# Patient Record
Sex: FEMALE | Race: AMERICAN INDIAN OR ALASKA NATIVE | NOT HISPANIC OR LATINO | URBAN - METROPOLITAN AREA
[De-identification: names, ages, dates, MRNs, and addresses within clinical notes are randomized per-mention and may not be internally consistent; named-entity substitution may affect disease eponyms.]

---

## 2023-09-30 ENCOUNTER — INPATIENT (INPATIENT)
Facility: HOSPITAL | Age: 88
LOS: 3 days | Discharge: HOME | End: 2023-10-04
Attending: STUDENT IN AN ORGANIZED HEALTH CARE EDUCATION/TRAINING PROGRAM | Admitting: STUDENT IN AN ORGANIZED HEALTH CARE EDUCATION/TRAINING PROGRAM
Payer: MEDICARE

## 2023-09-30 VITALS
DIASTOLIC BLOOD PRESSURE: 61 MMHG | RESPIRATION RATE: 20 BRPM | SYSTOLIC BLOOD PRESSURE: 172 MMHG | HEART RATE: 83 BPM | TEMPERATURE: 97 F | OXYGEN SATURATION: 92 %

## 2023-09-30 DIAGNOSIS — Z29.9 ENCOUNTER FOR PROPHYLACTIC MEASURES, UNSPECIFIED: ICD-10-CM

## 2023-09-30 DIAGNOSIS — Z71.89 OTHER SPECIFIED COUNSELING: ICD-10-CM

## 2023-09-30 DIAGNOSIS — F03.90 UNSPECIFIED DEMENTIA, UNSPECIFIED SEVERITY, WITHOUT BEHAVIORAL DISTURBANCE, PSYCHOTIC DISTURBANCE, MOOD DISTURBANCE, AND ANXIETY: ICD-10-CM

## 2023-09-30 DIAGNOSIS — R41.82 ALTERED MENTAL STATUS, UNSPECIFIED: ICD-10-CM

## 2023-09-30 DIAGNOSIS — R56.9 UNSPECIFIED CONVULSIONS: ICD-10-CM

## 2023-09-30 DIAGNOSIS — B34.8 OTHER VIRAL INFECTIONS OF UNSPECIFIED SITE: ICD-10-CM

## 2023-09-30 LAB
ALBUMIN SERPL ELPH-MCNC: 4.4 G/DL — SIGNIFICANT CHANGE UP (ref 3.3–5)
ALP SERPL-CCNC: 83 U/L — SIGNIFICANT CHANGE UP (ref 40–120)
ALT FLD-CCNC: 14 U/L — SIGNIFICANT CHANGE UP (ref 4–33)
ANION GAP SERPL CALC-SCNC: 17 MMOL/L — HIGH (ref 7–14)
APPEARANCE UR: ABNORMAL
APTT BLD: 30.1 SEC — SIGNIFICANT CHANGE UP (ref 24.5–35.6)
AST SERPL-CCNC: 27 U/L — SIGNIFICANT CHANGE UP (ref 4–32)
B PERT DNA SPEC QL NAA+PROBE: SIGNIFICANT CHANGE UP
B PERT+PARAPERT DNA PNL SPEC NAA+PROBE: SIGNIFICANT CHANGE UP
BACTERIA # UR AUTO: ABNORMAL /HPF
BASOPHILS # BLD AUTO: 0.03 K/UL — SIGNIFICANT CHANGE UP (ref 0–0.2)
BASOPHILS NFR BLD AUTO: 0.2 % — SIGNIFICANT CHANGE UP (ref 0–2)
BILIRUB SERPL-MCNC: 0.9 MG/DL — SIGNIFICANT CHANGE UP (ref 0.2–1.2)
BILIRUB UR-MCNC: NEGATIVE — SIGNIFICANT CHANGE UP
BORDETELLA PARAPERTUSSIS (RAPRVP): SIGNIFICANT CHANGE UP
BUN SERPL-MCNC: 16 MG/DL — SIGNIFICANT CHANGE UP (ref 7–23)
C PNEUM DNA SPEC QL NAA+PROBE: SIGNIFICANT CHANGE UP
CALCIUM SERPL-MCNC: 9.5 MG/DL — SIGNIFICANT CHANGE UP (ref 8.4–10.5)
CAST: 0 /LPF — SIGNIFICANT CHANGE UP (ref 0–4)
CHLORIDE SERPL-SCNC: 97 MMOL/L — LOW (ref 98–107)
CO2 SERPL-SCNC: 24 MMOL/L — SIGNIFICANT CHANGE UP (ref 22–31)
COLOR SPEC: YELLOW — SIGNIFICANT CHANGE UP
CREAT SERPL-MCNC: 0.85 MG/DL — SIGNIFICANT CHANGE UP (ref 0.5–1.3)
DIFF PNL FLD: NEGATIVE — SIGNIFICANT CHANGE UP
EGFR: 65 ML/MIN/1.73M2 — SIGNIFICANT CHANGE UP
EOSINOPHIL # BLD AUTO: 0.06 K/UL — SIGNIFICANT CHANGE UP (ref 0–0.5)
EOSINOPHIL NFR BLD AUTO: 0.5 % — SIGNIFICANT CHANGE UP (ref 0–6)
FLUAV SUBTYP SPEC NAA+PROBE: SIGNIFICANT CHANGE UP
FLUBV RNA SPEC QL NAA+PROBE: SIGNIFICANT CHANGE UP
GLUCOSE BLDC GLUCOMTR-MCNC: 115 MG/DL — HIGH (ref 70–99)
GLUCOSE SERPL-MCNC: 202 MG/DL — HIGH (ref 70–99)
GLUCOSE UR QL: NEGATIVE MG/DL — SIGNIFICANT CHANGE UP
HADV DNA SPEC QL NAA+PROBE: SIGNIFICANT CHANGE UP
HCOV 229E RNA SPEC QL NAA+PROBE: SIGNIFICANT CHANGE UP
HCOV HKU1 RNA SPEC QL NAA+PROBE: SIGNIFICANT CHANGE UP
HCOV NL63 RNA SPEC QL NAA+PROBE: SIGNIFICANT CHANGE UP
HCOV OC43 RNA SPEC QL NAA+PROBE: SIGNIFICANT CHANGE UP
HCT VFR BLD CALC: 34.6 % — SIGNIFICANT CHANGE UP (ref 34.5–45)
HGB BLD-MCNC: 10.5 G/DL — LOW (ref 11.5–15.5)
HMPV RNA SPEC QL NAA+PROBE: SIGNIFICANT CHANGE UP
HPIV1 RNA SPEC QL NAA+PROBE: SIGNIFICANT CHANGE UP
HPIV2 RNA SPEC QL NAA+PROBE: SIGNIFICANT CHANGE UP
HPIV3 RNA SPEC QL NAA+PROBE: SIGNIFICANT CHANGE UP
HPIV4 RNA SPEC QL NAA+PROBE: SIGNIFICANT CHANGE UP
IANC: 10.6 K/UL — HIGH (ref 1.8–7.4)
IMM GRANULOCYTES NFR BLD AUTO: 0.6 % — SIGNIFICANT CHANGE UP (ref 0–0.9)
INR BLD: 1.05 RATIO — SIGNIFICANT CHANGE UP (ref 0.85–1.18)
KETONES UR-MCNC: NEGATIVE MG/DL — SIGNIFICANT CHANGE UP
LEUKOCYTE ESTERASE UR-ACNC: NEGATIVE — SIGNIFICANT CHANGE UP
LYMPHOCYTES # BLD AUTO: 1 K/UL — SIGNIFICANT CHANGE UP (ref 1–3.3)
LYMPHOCYTES # BLD AUTO: 8 % — LOW (ref 13–44)
M PNEUMO DNA SPEC QL NAA+PROBE: SIGNIFICANT CHANGE UP
MCHC RBC-ENTMCNC: 19.4 PG — LOW (ref 27–34)
MCHC RBC-ENTMCNC: 30.3 GM/DL — LOW (ref 32–36)
MCV RBC AUTO: 64 FL — LOW (ref 80–100)
MONOCYTES # BLD AUTO: 0.7 K/UL — SIGNIFICANT CHANGE UP (ref 0–0.9)
MONOCYTES NFR BLD AUTO: 5.6 % — SIGNIFICANT CHANGE UP (ref 2–14)
NEUTROPHILS # BLD AUTO: 10.6 K/UL — HIGH (ref 1.8–7.4)
NEUTROPHILS NFR BLD AUTO: 85.1 % — HIGH (ref 43–77)
NITRITE UR-MCNC: NEGATIVE — SIGNIFICANT CHANGE UP
NRBC # BLD: 0 /100 WBCS — SIGNIFICANT CHANGE UP (ref 0–0)
NRBC # FLD: 0.03 K/UL — HIGH (ref 0–0)
PH UR: 8 — SIGNIFICANT CHANGE UP (ref 5–8)
PLATELET # BLD AUTO: 220 K/UL — SIGNIFICANT CHANGE UP (ref 150–400)
POTASSIUM SERPL-MCNC: 4.6 MMOL/L — SIGNIFICANT CHANGE UP (ref 3.5–5.3)
POTASSIUM SERPL-SCNC: 4.6 MMOL/L — SIGNIFICANT CHANGE UP (ref 3.5–5.3)
PROT SERPL-MCNC: 8.1 G/DL — SIGNIFICANT CHANGE UP (ref 6–8.3)
PROT UR-MCNC: SIGNIFICANT CHANGE UP MG/DL
PROTHROM AB SERPL-ACNC: 11.7 SEC — SIGNIFICANT CHANGE UP (ref 9.5–13)
RAPID RVP RESULT: DETECTED
RBC # BLD: 5.41 M/UL — HIGH (ref 3.8–5.2)
RBC # FLD: 18.2 % — HIGH (ref 10.3–14.5)
RBC CASTS # UR COMP ASSIST: 4 /HPF — SIGNIFICANT CHANGE UP (ref 0–4)
REVIEW: SIGNIFICANT CHANGE UP
RSV RNA SPEC QL NAA+PROBE: SIGNIFICANT CHANGE UP
RV+EV RNA SPEC QL NAA+PROBE: DETECTED
SARS-COV-2 RNA SPEC QL NAA+PROBE: SIGNIFICANT CHANGE UP
SODIUM SERPL-SCNC: 138 MMOL/L — SIGNIFICANT CHANGE UP (ref 135–145)
SP GR SPEC: 1.05 — HIGH (ref 1–1.03)
SQUAMOUS # UR AUTO: 1 /HPF — SIGNIFICANT CHANGE UP (ref 0–5)
TROPONIN T, HIGH SENSITIVITY RESULT: 23 NG/L — SIGNIFICANT CHANGE UP
TROPONIN T, HIGH SENSITIVITY RESULT: 25 NG/L — SIGNIFICANT CHANGE UP
UROBILINOGEN FLD QL: 0.2 MG/DL — SIGNIFICANT CHANGE UP (ref 0.2–1)
WBC # BLD: 12.47 K/UL — HIGH (ref 3.8–10.5)
WBC # FLD AUTO: 12.47 K/UL — HIGH (ref 3.8–10.5)
WBC UR QL: 2 /HPF — SIGNIFICANT CHANGE UP (ref 0–5)

## 2023-09-30 PROCEDURE — 0042T: CPT | Mod: MA

## 2023-09-30 PROCEDURE — 93010 ELECTROCARDIOGRAM REPORT: CPT

## 2023-09-30 PROCEDURE — 70498 CT ANGIOGRAPHY NECK: CPT | Mod: 26,MA

## 2023-09-30 PROCEDURE — 71045 X-RAY EXAM CHEST 1 VIEW: CPT | Mod: 26

## 2023-09-30 PROCEDURE — 70496 CT ANGIOGRAPHY HEAD: CPT | Mod: 26,MA

## 2023-09-30 PROCEDURE — 99291 CRITICAL CARE FIRST HOUR: CPT

## 2023-09-30 PROCEDURE — 99223 1ST HOSP IP/OBS HIGH 75: CPT

## 2023-09-30 RX ORDER — LANOLIN ALCOHOL/MO/W.PET/CERES
3 CREAM (GRAM) TOPICAL AT BEDTIME
Refills: 0 | Status: DISCONTINUED | OUTPATIENT
Start: 2023-09-30 | End: 2023-10-04

## 2023-09-30 RX ORDER — ACETAMINOPHEN 500 MG
1000 TABLET ORAL ONCE
Refills: 0 | Status: COMPLETED | OUTPATIENT
Start: 2023-09-30 | End: 2023-09-30

## 2023-09-30 RX ORDER — HEPARIN SODIUM 5000 [USP'U]/ML
5000 INJECTION INTRAVENOUS; SUBCUTANEOUS EVERY 12 HOURS
Refills: 0 | Status: DISCONTINUED | OUTPATIENT
Start: 2023-09-30 | End: 2023-10-04

## 2023-09-30 RX ORDER — ACETAMINOPHEN 500 MG
650 TABLET ORAL EVERY 6 HOURS
Refills: 0 | Status: DISCONTINUED | OUTPATIENT
Start: 2023-09-30 | End: 2023-10-04

## 2023-09-30 RX ORDER — MEMANTINE HYDROCHLORIDE AND DONEPEZIL HYDROCHLORIDE 21; 10 MG/1; MG/1
1 CAPSULE ORAL
Refills: 0 | DISCHARGE

## 2023-09-30 RX ORDER — RISPERIDONE 4 MG/1
1 TABLET ORAL
Refills: 0 | DISCHARGE

## 2023-09-30 RX ADMIN — Medication 400 MILLIGRAM(S): at 11:18

## 2023-09-30 NOTE — ED PROVIDER NOTE - ATTENDING CONTRIBUTION TO CARE
89F h/o dementia, HTN , presenting an episode of stiffness that started earlier today.  Per daughter and caregiver at bedside, they state that patient usually has a small amount of urine on the bed after she wakes up, however today there was an increased amount of urine.  Patient was at her usual state of activity at that time (7am), appropriately having a conversation at her baseline.  15 minutes later when she went to the bathroom she had a sudden onset stiffness of the bilateral upper and lower extremities, after a few minutes was able to be seated on the toilet to have a bowel movement while still stiff.  When patient managed to lift herself up again she continued to have stiffness and at that time EMS was called.  Family denies any prior fevers or recent illnesses, notes that patient has been having increased urinary frequency since last night.  Patient at this time is not able to answer questions but appears to be able to copy actions and movements that she sees around her.  Responsive to pain of the bilateral upper extremities.  On triage vital signs were nonactionable. 89F h/o dementia, HTN BIBEMS with diffused muscle stiffness and AMS. Pt not able to provide information, history obtained from caregiver and daughter. Per aid, pt had urinated in the bed and pt had walked with assistance (baseline) to bathroom before she became stiff and was no longer answering questions. Aid notes that pt had increased urination throughout yesterday as well. Aid states she was able to have a BM during this time but was not responsive or following commands. Per EMS, pt was sitting with R arm contracted on arrival, not following commands. Per family, pt usually able to answer questions and follow commands but does need assistance with ambulation.   Gen: awake, intermittently following commands, no answering verbally  CV: tachy but regular  Pulm: clear lungs b/l  Abd: soft, ntnd  Ext: no edema/swelling, moving both arms spontaneously  Neuro: unable to fully assess due to non-compliance with exam  Skin: no rash/petechiae  MDM: 89F h/o dementia, HTN BIBEMS with diffused muscle stiffness and AMS - pt not participating in full neuro exam but no longer with stiffness. Intermittently following commands. Presenting symptoms less concerning for CVA. Pt appears to have painful stimuli intact b/l. Not following commands but is moving all extremities spontaneously. Also less concerning for seizure bc pt had urinary incontinence in bed but at time as at her baseline mental status and was able to ambulate to bathroom with usual level of assistance. Given frequent urination and presenting symptoms, more concerning for sepsis 2/2 UTI or other metabolic/infectious etiology. Code stroke called on arrival and assessed by neuro. CT, CTA, and CT perfusion ordered. In addition to stroke protocol, will obtain UA/UC. Concern for infectious vs metabolic causes of symptoms, less likely to be acute neuro process. 89F h/o dementia, HTN BIBEMS with diffused muscle stiffness and AMS. Pt not able to provide information, history obtained from caregiver and daughter. Per aid, pt had urinated in the bed and pt had walked with assistance (baseline) to bathroom before she became stiff and was no longer answering questions. Aid notes that pt had increased urination throughout yesterday as well. Aid states she was able to have a BM during this time but was not responsive or following commands. Per EMS, pt was sitting with R arm contracted on arrival, not following commands. Per family, pt usually able to answer questions and follow commands but does need assistance with ambulation.   Gen: awake, intermittently following commands, no answering verbally  CV: tachy but regular  Pulm: clear lungs b/l  Abd: soft, ntnd  Ext: no edema/swelling, moving both arms spontaneously  Neuro: unable to fully assess due to non-compliance with exam  Skin: no rash/petechiae  MDM: 89F h/o dementia, HTN BIBEMS with diffused muscle stiffness and AMS - pt not participating in full neuro exam but no longer with stiffness. Intermittently following commands. Presenting symptoms less concerning for CVA. Pt appears to have painful stimuli intact b/l. Not following commands but is moving all extremities spontaneously. Also less concerning for seizure bc pt had urinary incontinence in bed but at time as at her baseline mental status and was able to ambulate to bathroom with usual level of assistance. Given frequent urination and presenting symptoms, more concerning for sepsis 2/2 UTI or other metabolic/infectious etiology. Code stroke called on arrival and assessed by neuro. CT, CTA, and CT perfusion ordered. In addition to stroke protocol, will obtain UA/UC. Concern for infectious vs metabolic causes of symptoms, less likely to be acute neuro process.    Upon my evaluation, this patient is at high risk for imminent or life threatening deterioration due to concern for acute CVA vs seizure vs metabolic/infectious cause of change in mental status and other active medical issues which require my prompt direct attention, intervention, personal management, and frequent reassessment. I have personally spent   56    discontinuous minutes  of critical care time exclusive of time spent on separate billing procedures. This includes review of laboratory data, radiology results, discussion with primary team, and monitoring for potential decompensation. Interventions were performed as documented above.

## 2023-09-30 NOTE — CONSULT NOTE ADULT - ASSESSMENT
ASSESSMENT       IMPRESSION   Overall (improved, worsened, stable) neurologically.     RECOMMENDATION         Patient to be seen by team and attending. Note finalized upon attending attestation.  ASSESSMENT       IMPRESSION   Unresponsiveness during period of whole body stiffening & bowel movement followed by AMS and fatigue for a few hours and EMS reporting a period of unilateral left arm stiffening. Leukocytosis on labwork and recent excess urination reported. Concerning for seizure, consistent with tonic seizure, likely provoked in the setting of possible infection.     RECOMMENDATION   [] check UA/ Infectious workup per primary team  [] check Utox, ETOH level, TSH, T3/T4, RPR, vitamin B1, B6, B12, creatnine kinase, ammonia  [] 2 hr EEG  [] MRI brain w/ & w/o contrast to evaluate for structural etiology, can be outpatient    Case discussed with telestroke attending Dr. Levy & seen with Neurology attending Dr. Martha Mohan. Note finalized upon attending attestation.  ASSESSMENT   RADHA CRUZ, 89y (26-Mar-1933) F w/ PMHx significant for dementia with behavioral disturbance and borderline HTN on low dose lisinopril presents to the ED for unresponsiveness and whole body stiffening. Stroke code called on arrival. LKW 7:00 AM 9/30/23. While being helped to walk towards the toilet patient's whole body stiffened, she was sat down on the toilet with stiffened body where within seconds she had a bowel movement. She was unresponsive and staring throughout this episode. She was noted by EMS to have AMS, unresponsive and not following command with a stiff and contracted left arm. Family reports she had been urinating multiple times the day and night prior to this episode which is not normal for her. ED Stroke CT workup was negative for acute findings.    NIHSS: 17  preMRS: 5 (Constant nursing care for safety 2/2 dementia, walks with walker)    IMPRESSION   Unresponsiveness during period of whole body stiffening & bowel movement followed by AMS and fatigue for a few hours and EMS reporting a period of unilateral left arm stiffening. Leukocytosis on labwork and recent excess urination reported. Concerning for seizure, consistent with tonic seizure, likely provoked in the setting of possible infection.     RECOMMENDATION   [] check UA/ Infectious workup per primary team  [] check Utox, ETOH level, TSH, T3/T4, RPR, vitamin B1, B6, B12, creatnine kinase, ammonia  [] 2 hr EEG  [] MRI brain w/ & w/o contrast to evaluate for structural etiology, can be outpatient  [] Patient can follow up with general neurology at [address] [place] [address] 1-2 weeks after discharge, if possible scheduled for after her recommended MRI. Please instruct the patient to call [phone] to schedule this appointment.    Case discussed with telestroke attending Dr. Levy & seen with Neurology attending Dr. Martha Mohan. Note finalized upon attending attestation.  ASSESSMENT   RADHA CRUZ, 89y (26-Mar-1933) F w/ PMHx significant for dementia with behavioral disturbance and borderline HTN on low dose lisinopril presents to the ED for unresponsiveness and whole body stiffening. Stroke code called on arrival. LKW 7:00 AM 9/30/23. While being helped to walk towards the toilet patient's whole body stiffened, she was sat down on the toilet with stiffened body where within seconds she had a bowel movement. She was unresponsive and staring throughout this episode. She was noted by EMS to have AMS, unresponsive and not following command with a stiff and contracted left arm. Family reports she had been urinating multiple times the day and night prior to this episode which is not normal for her. ED Stroke CT workup was negative for acute findings.    NIHSS: 17  preMRS: 5 (Constant nursing care for safety 2/2 dementia, walks with walker)    IMPRESSION   Unresponsiveness during period of whole body stiffening & bowel movement followed by AMS and fatigue for a few hours and EMS reporting a period of unilateral left arm stiffening. Leukocytosis on labwork and recent excess urination reported. Concerning for seizure, consistent with tonic seizure, likely provoked in the setting of possible infection.     RECOMMENDATION   [] check UA/ Infectious workup per primary team  [] check Utox, ETOH level, TSH, T3/T4, RPR, vitamin B1, B6, B12, creatnine kinase, ammonia  [] 2 hr EEG  Semiology:  Home medications:    Impression:  Suspicious for seizures although differentials include syncope (vasovagal, cardiac), underlying cardiac etiology/ dysautonomia, sleep disorder (i.e narcolepsy), TIA/stroke, toxic/metabolic derangements, psychogenic nonepileptic seizures.     Recommendations:    [ ] obtain blood levels of AEDs: *used for adherence monitoring than dosing for theurapeutic effect. Mainly used for phenytoin and carbamazepine.  [ ] CBC, CMP, Mg, P, CpK, UA, Utox, ammonia, troponin, VBG/lactate, basic infectious workup  [] Lorazepam 1mg IV PRN for seizure activity lasting >3-5mins, >2x/hr, >3x/day, or if GTC , repeat after 1 minute (max dose 0.1 mg/kg)    [] NPO until swallow evaluation, seizure, fall & aspiration precautions  [] MRI brain w/ & w/o contrast to evaluate for structural etiology, can be outpatient  [] Given concern for seizure, advise patient to not drive, operate heavy machinery, avoid heights, pools, bathtubs, locked doors until cleared by further follow up outpatient by neurology.   [ ] Please note: if patient has a convulsion, please document length of episode, specifically what patient was doing paying attention to eye opening vs closure, gaze deviation, shaking of extremities, tongue bite, urinary incontinence, any derangement of vital signs. Generalized motor seizures can have dilated and unreactive pupils, absent oculocephalic reflexes (no dolls eyes), and open eyelids (99% of cases). Head turning from sided to side, pelvic thrusting, bicycling movements, hand waving are NOT manifestations of generalized motor seizures.  [] Patient can follow up with general neurology at [address] [place] [address] 1-2 weeks after discharge, if possible scheduled for after her recommended MRI. Please instruct the patient to call [phone] to schedule this appointment.    Case discussed with telestroke attending Dr. Levy & seen with Neurology attending Dr. Martha Mohan. Note finalized upon attending attestation.  ASSESSMENT   RADHA CRUZ, 89y (26-Mar-1933) F w/ PMHx significant for dementia with behavioral disturbance and borderline HTN on low dose lisinopril presents to the ED for unresponsiveness and whole body stiffening. Stroke code called on arrival. LKW 7:00 AM 9/30/23. While being helped to walk towards the toilet patient's whole body stiffened, she was sat down on the toilet with stiffened body where within seconds she had a bowel movement. She was unresponsive and staring throughout this episode. She was noted by EMS to have AMS, unresponsive and not following command with a stiff and contracted left arm. Family reports she had been urinating multiple times the day and night prior to this episode which is not normal for her. ED Stroke CT workup was negative for acute findings.    NIHSS: 17  preMRS: 5 (Constant nursing care for safety 2/2 dementia, walks with walker)    IMPRESSION   Unresponsiveness during period of whole body stiffening & bowel movement followed by AMS and fatigue for a few hours and EMS reporting a period of unilateral left arm stiffening. Leukocytosis on labwork and recent excess urination reported. Concerning for seizure, consistent with tonic seizure, likely provoked in the setting of possible infection.     RECOMMENDATION   [] check UA/ Infectious workup per primary team  [] check Utox, ETOH level, TSH, T3/T4, RPR, vitamin B1, B6, B12, creatnine kinase, ammonia  [] 2 hr EEG  [] Lorazepam 1mg IV PRN for seizure activity lasting >3-5mins, >2x/hr, >3x/day, or if GTC , repeat after 1 minute (max dose 0.1 mg/kg)    [] NPO until swallow evaluation, seizure, fall & aspiration precautions  [] MRI brain w/ & w/o contrast to evaluate for structural etiology, can be outpatient  [] Given concern for seizure, advise patient to not drive, operate heavy machinery, avoid heights, pools, bathtubs, locked doors until cleared by further follow up outpatient by neurology.   [] Please note: if patient has a convulsion, please document length of episode, specifically what patient was doing paying attention to eye opening vs closure, gaze deviation, shaking of extremities, tongue bite, urinary incontinence, any derangement of vital signs. Generalized motor seizures can have dilated and unreactive pupils, absent oculocephalic reflexes (no dolls eyes), and open eyelids (99% of cases). Head turning from sided to side, pelvic thrusting, bicycling movements, hand waving are NOT manifestations of generalized motor seizures.  [] Patient can follow up with general neurology at [address] [place] [address] 1-2 weeks after discharge, if possible scheduled for after her recommended MRI. Please instruct the patient to     Case discussed with telestroke attending Dr. Levy & seen with Neurology attending Dr. Martha Mohan. Note finalized upon attending attestation.

## 2023-09-30 NOTE — H&P ADULT - ASSESSMENT
89 y/o F with pmhx of dementia presented to the ED for new onset leg stiffness. Admit for work up of possible seizure.

## 2023-09-30 NOTE — H&P ADULT - HISTORY OF PRESENT ILLNESS
This is a 89 y/o F with pmhx of dementia presented to the ED for new onset leg stiffness. The patient was getting up to go to the bathroom however when she sat down, the patient could not get up from the toilet. The patient was being assisted by the HHA and noted that her legs were stiff b/l. Denies shaking or tremors. The patient was also not responding to verbal stimuli, however was "awake". The patient otherwise is a poor historian and can only answer yes/no questions. The daughter on the phone provided hx, Pricilla, who mainly takes care of her at home. The patient went back to baseline once EMS arrived and was able to get up from the toilet. The patient has no hx of seizures. At bedside, the patient is awake and alert, does not have any stiffness and moves all extremities without resistance. She has been having a mild dry cough for the last few days. Denies runny nose. No fevers.

## 2023-09-30 NOTE — ED ADULT NURSE NOTE - NSFALLRISKFACTORS_ED_ALL_ED
[FreeTextEntry1] : Bryan 11/23/2022:  Reviewed and interpreted with values mostly at goal with occasional hyperglycemia occasionally after a meal. No hypoglycemia. At goal 91% of the time. Hyperglycemic 9%. No hypoglycemia. Highest values after breakfast. \par \par Labs 11/23/2022:\par A1c 7.2%\par \par Bryan 8/10/2022:  Reviewed and interpreted with values mostly at goal with occasional hyperglycemia occasionally after a meal. No hypoglycemia. At goal 74% of the time. Hyperglycemic 26%. No hypoglycemia. Highest values 8/4-8/5 s/p Solu-Medrol. \par \par Labs 8/10/2022:\par A1c 6.8%\par \par Bryan 4/27/2022:  Reviewed and interpreted with values mostly at goal with occasional hyperglycemia occasionally after a meal. No hypoglycemia. At goal 80% of the time. Hyperglycemic 20%. No hypoglycemia. \par \par Labs 4/25//2022:\par A1c 6.8%\par CBC normal\par CMP normal except glucose of 133\par \par Bryan 1/19/2022:  Reviewed and interpreted with values mostly at goal with occasional hyperglycemia occasionally after a meal. No hypoglycemia. At goal 84% of the time. Hyperglycemic 16%. No hypoglycemia. \par \par Labs 1/19/2022:\par A1c 7.3%\par \par Bryan 9/15/2021:  Reviewed and interpreted with values mostly at goal with occasional hyperglycemia occasionally after a meal. No hypoglycemia. At goal 85% of the time. Hyperglycemic 15%. No hypoglycemia. \par \par Labs 9/15/2021:\par A1c 6.7%\par \par Bryan 6/15/2021:  Reviewed and interpreted with values mostly at goal with occasional hyperglycemia occasionally after a meal. No hypoglycemia. At goal 93% of the time. Hyperglycemic 7%\par \par Labs 6/15/2021:\par A1c 6.7%\par \par Bryan 5/3/2021: Reviewed and interpreted with values mostly at goal with occasional hyperglycemia usually early morning or after a meal.\par \par Labs 5/2021:\par Micro/Cr 28\par \par Labs 2/9/2021:\par A1c 7.9%\par CMP normal except glucose of 152\par Vit D 35.5 Age: 85 years old or older

## 2023-09-30 NOTE — ED ADULT NURSE NOTE - OBJECTIVE STATEMENT
Brit RN: Patient received to trauma A, non-verbal at this time, responds to verbal stimuli, awake and alert, calm and cooperative, usually ambulatory with a cane coming to the ED as a code stroke. Patient at baseline has dementia. As per family, "patient woke up this morning with urine all around her at approximately 7am, while walking, patient began to       Pt with sudden onset weakness, speech changes, inability to walk; started at 7:15a  Pt has dementia and can be confused at baseline but  is normally able to follow commands, has good hand strength and walks without assistance; this a.m. pt sat on edge of bathtub and could not move and speech became markedly garbled; in triage pt's speech garbled, pt cannot  and  bilateral arms exhibit  ++ drift  Code stroke activated  Hx of HTN and DM type 2 Brit RN: Patient received to CT scan as a code stroke, non-verbal at this time, responds to verbal stimuli, eyes open, awake and alert, calm and cooperative, following some commands, at baseline ambulatory with a cane, accompanied by two family members with PMHx, dementia (often confused at baseline, but normally follows commands and ambulates with no assist), HTN, DM2 coming to the ED by EMS for complaints of sudden onset weakness and "stiffness" in the bilateral upper and lower extremities. As per family last known normal at 7 am this morning. Family states "patient woke up at 7am with an episode of urinary incontinence, while walking to the bathroom patient began to get progressively more stiff in the upper and lower extremities, speech changes noted and increased confusion was noted." Family states "patient was in need of more assist than normal to ambulate to the bathroom." On exam patient noted to have +drift to the bilateral upper extremities, speech noted to be garbled, unable to raise lower extremities, +sensation, unable to  hands. Patient noted to be tremulous, rectal temperature obtained (100.9). Placed on cardiac monitor, sinus tachycardia noted. Patient placed on prima-fit. EKG obtained. FS obtained in triage. RR equal, mildly labored, breathing with mouth open. abdomen soft, non-tender, non-distended. blanchable redness noted to the bilateral heels. 20G IV placed in the R AC. labs drawn and sent. Care plan cotninued. Comfort measures provided. Safety maintained. Awaiting lab results and imaging. Report given to primary RN Dana.

## 2023-09-30 NOTE — CONSULT NOTE ADULT - ATTENDING COMMENTS
Ms. Lawrence is a 89 yo woman with a h/o dementia with episode consistent with seizure - first seizure ever.   I agree with work up and management as above.   Thank you.

## 2023-09-30 NOTE — H&P ADULT - NSHPPHYSICALEXAM_GEN_ALL_CORE
PHYSICAL EXAM:  VITALS: Vital Signs Last 24 Hrs  T(C): 36.3 (30 Sep 2023 16:35), Max: 38.3 (30 Sep 2023 09:15)  T(F): 97.3 (30 Sep 2023 16:35), Max: 100.9 (30 Sep 2023 09:15)  HR: 77 (30 Sep 2023 16:50) (65 - 99)  BP: 146/69 (30 Sep 2023 16:35) (139/60 - 172/61)  BP(mean): 79 (30 Sep 2023 12:50) (79 - 79)  RR: 18 (30 Sep 2023 16:35) (18 - 22)  SpO2: 100% (30 Sep 2023 16:35) (92% - 100%)    Parameters below as of 30 Sep 2023 16:35  Patient On (Oxygen Delivery Method): room air      GENERAL: NAD, comfortable at bedside  HEAD:  Atraumatic, Normocephalic  EYES: EOMI, PERRL, conjunctiva and sclera clear  ENT: Moist Mucus Membranes present, no ulcers appreciated  NECK: Supple, No JVD  CHEST/LUNG: Clear to auscultation bilaterally; No wheezes, rales or rhonchi, no accessory muscle use  HEART: Regular rate and rhythm; No murmurs, rubs, or gallops, (+)S1, S2  ABDOMEN: Soft, Nontender, Nondistended; Normal Bowel sounds   EXTREMITIES: No clubbing, cyanosis, or edema, good ROM, no contractions noted  PSYCH: normal mood and affect  NEUROLOGY: AAOx1, non-focal  SKIN: No rashes or lesions

## 2023-09-30 NOTE — CONSULT NOTE ADULT - SUBJECTIVE AND OBJECTIVE BOX
Neurology - Consult Note    -  Spectra: [phone] ([facility]), [phone] ([facility])  -    HPI: RADHA CRUZ, 89y (26-Mar-1933) F w/ PMHx significant for dementia and borderline HTN on low dose lisinopril presents to the ED for unresponsiveness and whole body stiffening. Stroke code called on arrival. LKW 7:00 AM 9/30/23. Patient was woken by her daughter as she is every morning and it was noted that she had urinated the bed. Her daughter and grand daughter were helping her up towards the toilet when suddenly her whole body stiffened. She was close to the toilet when she stiffened and her family quickly manually sat her stiffened and hunched body with contracted limbs over the toilet where within seconds she had a bowel movement. She was unresponsive and staring throughout this episode. EMS was called and the patient's body remained contracted and hunched until EMS arrived about 15 minutes later at which point she was noted by EMS to have AMS, unresponsive and not following command with a stiff and contracted left arm. Family reports the patient was unable to move herself and required 2 person assistance for any movements during this episode.  Of note, patient's family reports she had been urinating multiple times the day and night prior to this episode which is not normal for her. Patient has a baseline mental status of A&Ox 1 - 2, occasionally having issues orientation to time and occasionally location. Patient is currently unable to consistently follow verbal command and is not responding to any questions. Unable to obtain ROS due to mental status    Review of Systems:   See HPI    Allergies:  No Known Allergies      PMHx/PSHx/Family Hx: As above, otherwise see below   HTN (hypertension)    Syncope        Social Hx:  No current use of tobacco, alcohol, or illicit drugs      Medications:  MEDICATIONS  (STANDING):    MEDICATIONS  (PRN):      Vitals:  T(C): 36.1 (09-30-23 @ 08:53), Max: 36.1 (09-30-23 @ 08:53)  HR: 83 (09-30-23 @ 08:53) (83 - 83)  BP: 172/61 (09-30-23 @ 08:53) (172/61 - 172/61)  RR: 20 (09-30-23 @ 08:53) (20 - 20)  SpO2: 92% (09-30-23 @ 08:53) (92% - 92%)    Physical Examination: INCOMPLETE  General - NAD  Cardiovascular - Peripheral pulses palpable, no edema  Eyes - Fundoscopy with flat, sharp optic discs and no hemorrhage or exudates; Fundoscopy not well visualized; Fundoscopy not performed due to safety precautions in the setting of infection risk    Neurologic Exam:  Mental status - Awake, Alert, Oriented to person, place, and time. Speech fluent, repetition and naming intact. Follows simple and complex commands. Attention/concentration, recent and remote memory (including registration and recall), and fund of knowledge intact    Cranial nerves - PERRLA, VFF, EOMI, face sensation (V1-V3) intact b/l, facial strength intact without asymmetry b/l, hearing intact b/l, palate with symmetric elevation, trapezius OR sternocleidomastoid 5/5 strength b/l, tongue midline on protrusion with full lateral movement    Motor - Normal bulk and tone throughout. No pronator drift.  Strength testing            R        Deltoid:  5    Biceps:  5    Triceps:  5    Wrist Extension:  5    Wrist Flexion:  5    Interossei:  5    :  5    Hip Flexion:  5    Hip Extension:  5    Knee Flexion:  5    Knee Extension:  5    Dorsiflexion:  5    Plantar Flexion:  5        L        Deltoid:  5    Biceps:  5    Triceps:  5    Wrist Extension:  5    Wrist Flexion:  5    Interossei:  5    :  5    Hip Flexion:  5    Hip Extension:  5    Knee Flexion:  5    Knee Extension:  5    Dorsiflexion:  5    Plantar Flexion:  5      Sensation - Light touch/temperature OR pain/vibration intact throughout    DTR's -               R  Biceps:  2+    Triceps:  2+    Brachioradialis:  2+    Patellar:  2+    Ankle:  2+    Toes/plantar response:  Down    L  Biceps:  2+    Triceps:  2+    Brachioradialis:  2+    Patellar:  2+    Ankle:  2+    Toes/plantar response:  Down    Coordination - Finger to Nose intact b/l. No tremors appreciated    Gait and station - Normal casual gait. Romberg (-)    Labs:                        10.5   12.47 )-----------( 220      ( 30 Sep 2023 09:04 )             34.6           CAPILLARY BLOOD GLUCOSE      POCT Blood Glucose.: 186 mg/dL (30 Sep 2023 08:54)        PT/INR - ( 30 Sep 2023 09:04 )   PT: 11.7 sec;   INR: 1.05 ratio         PTT - ( 30 Sep 2023 09:04 )  PTT:30.1 sec  CSF:                  Radiology:     Neurology - Consult Note    -  Spectra: [phone] ([facility]), [phone] ([facility])  -    HPI: RADHA CRUZ, 89y (26-Mar-1933) F w/ PMHx significant for dementia and borderline HTN on low dose lisinopril presents to the ED for unresponsiveness and whole body stiffening. Stroke code called on arrival. LKW 7:00 AM 9/30/23. Patient was woken by her daughter as she is every morning and it was noted that she had urinated the bed. Her daughter and grand daughter were helping her up towards the toilet when suddenly her whole body stiffened. She was close to the toilet when she stiffened and her family quickly manually sat her stiffened and hunched body with contracted limbs over the toilet where within seconds she had a bowel movement. She was unresponsive and staring throughout this episode. EMS was called and the patient's body remained contracted and hunched until EMS arrived about 15 minutes later at which point she was noted by EMS to have AMS, unresponsive and not following command with a stiff and contracted left arm. Family reports the patient was unable to move herself and required 2 person assistance for any movements during this episode.  Of note, patient's family reports she had been urinating multiple times the day and night prior to this episode which is not normal for her. Patient has a baseline mental status of A&Ox 1 - 2, occasionally having issues orientation to time and occasionally location. Patient is currently unable to consistently follow verbal command and is not responding to any questions. Unable to obtain ROS due to mental status    NIHSS: 16  preMRS: 2      Review of Systems:   See HPI    Allergies:  No Known Allergies      PMHx/PSHx/Family Hx: As above, otherwise see below   HTN (hypertension)    Syncope        Social Hx:  No current use of tobacco, alcohol, or illicit drugs      Medications:  MEDICATIONS  (STANDING):    MEDICATIONS  (PRN):      Vitals:  T(C): 36.1 (09-30-23 @ 08:53), Max: 36.1 (09-30-23 @ 08:53)  HR: 83 (09-30-23 @ 08:53) (83 - 83)  BP: 172/61 (09-30-23 @ 08:53) (172/61 - 172/61)  RR: 20 (09-30-23 @ 08:53) (20 - 20)  SpO2: 92% (09-30-23 @ 08:53) (92% - 92%)    Physical Examination: INCOMPLETE  General - NAD  Cardiovascular - Peripheral pulses palpable, no edema  Eyes - Fundoscopy with flat, sharp optic discs and no hemorrhage or exudates; Fundoscopy not well visualized; Fundoscopy not performed due to safety precautions in the setting of infection risk    Neurologic Exam:  Mental status - Awake, Alert, Oriented to person, place, and time. Speech fluent, repetition and naming intact. Follows simple and complex commands. Attention/concentration, recent and remote memory (including registration and recall), and fund of knowledge intact    Cranial nerves - PERRLA, VFF, EOMI, face sensation (V1-V3) intact b/l, facial strength intact without asymmetry b/l, hearing intact b/l, palate with symmetric elevation, trapezius OR sternocleidomastoid 5/5 strength b/l, tongue midline on protrusion with full lateral movement    Motor - Normal bulk and tone throughout. No pronator drift.  Strength testing            R        Deltoid:  5    Biceps:  5    Triceps:  5    Wrist Extension:  5    Wrist Flexion:  5    Interossei:  5    :  5    Hip Flexion:  5    Hip Extension:  5    Knee Flexion:  5    Knee Extension:  5    Dorsiflexion:  5    Plantar Flexion:  5        L        Deltoid:  5    Biceps:  5    Triceps:  5    Wrist Extension:  5    Wrist Flexion:  5    Interossei:  5    :  5    Hip Flexion:  5    Hip Extension:  5    Knee Flexion:  5    Knee Extension:  5    Dorsiflexion:  5    Plantar Flexion:  5      Sensation - Light touch/temperature OR pain/vibration intact throughout    DTR's -               R  Biceps:  2+    Triceps:  2+    Brachioradialis:  2+    Patellar:  2+    Ankle:  2+    Toes/plantar response:  Down    L  Biceps:  2+    Triceps:  2+    Brachioradialis:  2+    Patellar:  2+    Ankle:  2+    Toes/plantar response:  Down    Coordination - Finger to Nose intact b/l. No tremors appreciated    Gait and station - Normal casual gait. Romberg (-)    Labs:                        10.5   12.47 )-----------( 220      ( 30 Sep 2023 09:04 )             34.6           CAPILLARY BLOOD GLUCOSE      POCT Blood Glucose.: 186 mg/dL (30 Sep 2023 08:54)        PT/INR - ( 30 Sep 2023 09:04 )   PT: 11.7 sec;   INR: 1.05 ratio         PTT - ( 30 Sep 2023 09:04 )  PTT:30.1 sec  CSF:                  Radiology:     Neurology - Consult Note    -  Spectra: [phone] ([facility]), [phone] ([facility])  -    HPI: RADHA CRUZ, 89y (26-Mar-1933) F w/ PMHx significant for dementia with behavioral disturbance and borderline HTN on low dose lisinopril presents to the ED for unresponsiveness and whole body stiffening. Stroke code called on arrival. LKW 7:00 AM 9/30/23. Patient was woken by her daughter and nursing aid as she is every morning and it was noted that she had urinated the bed. Her daughter and aid were helping her up towards the toilet when suddenly her whole body stiffened. She was close to the toilet when she stiffened and was quickly manually sat down with stiffened and hunched body and contracted limbs on the toilet where within seconds she had a bowel movement. She was unresponsive and staring throughout this episode. EMS was called and the patient's body remained contracted and hunched until EMS arrived about 15 minutes later at which point she was noted by EMS to have AMS, unresponsive and not following command with a stiff and contracted left arm. Family reports the patient was unable to move herself and required 2 person assistance for any movements during this episode.  Of note, patient's family reports she had been urinating multiple times the day and night prior to this episode which is not normal for her. Patient has a baseline mental status of A&Ox 1 - 2, occasionally having issues orientation to time and occasionally location. ED Stroke CT workup was negative for acute findings. Patient is currently unable to consistently follow verbal command and is not responding to any questions. Upon re-examination 1.5 hrs after initial stroke examination, patient is now able to follow commands, occasionally agitated with difficult to understand speech. Per family, patient has difficult to understand speech at baseline. Unable to obtain ROS due to speech and mental status    NIHSS: 17  preMRS: 5 (Constant nursing care for safety 2/2 dementia, walks with walker)  Patient was not a thrombolytic candidate as seizure is a more likely diagnosis  Patient was not a thrombectomy candidate as there was no LVO on imaging       Review of Systems:   See HPI    Allergies:  No Known Allergies      PMHx/PSHx/Family Hx: As above, otherwise see below   HTN (hypertension)    Syncope        Social Hx:  No current use of tobacco, alcohol, or illicit drugs      Medications:  MEDICATIONS  (STANDING):    MEDICATIONS  (PRN):      Vitals:  T(C): 36.1 (09-30-23 @ 08:53), Max: 36.1 (09-30-23 @ 08:53)  HR: 83 (09-30-23 @ 08:53) (83 - 83)  BP: 172/61 (09-30-23 @ 08:53) (172/61 - 172/61)  RR: 20 (09-30-23 @ 08:53) (20 - 20)  SpO2: 92% (09-30-23 @ 08:53) (92% - 92%)    Physical Examination:  General - Open mouth with dry tongue.  Cardiovascular - Peripheral pulses palpable, no edema  Eyes - Fundoscopy not performed due to safety precautions in the setting of infection risk    Neurologic Exam:  Mental status - A&O x 1 to self, not to location or time. Speech fluent but unintelligible, repetition and naming difficult to assess due to speech. Follows simple but not complex commands. Attention/concentration intact    Cranial nerves - PERRLA, VFF, EOMI, face sensation (V1-V3) intact b/l, facial strength intact without asymmetry b/l, hearing intact b/l, palate with symmetric elevation, trapezius 5/5 strength b/l, tongue midline on protrusion with full lateral movement    Motor - Cachectic appearing with decreased muscle bulk. No pronator drift.  Strength testing            R        Deltoid:  5    Biceps:  5    Triceps:  5    Wrist Extension:  5    Wrist Flexion:  5    Interossei:  5    :  5    Hip Flexion:  5    Hip Extension:  5    Knee Flexion:  5    Knee Extension:  5    Dorsiflexion:  5    Plantar Flexion:  5        L        Deltoid:  5    Biceps:  5    Triceps:  5    Wrist Extension:  5    Wrist Flexion:  5    Interossei:  5    :  5    Hip Flexion:  5    Hip Extension:  5    Knee Flexion:  5    Knee Extension:  5    Dorsiflexion:  5    Plantar Flexion:  5      Sensation - Light touch/temperatureintact throughout    DTR's -               R  Biceps:  3+    Triceps:  3+    Brachioradialis:  3+    Patellar:  3+    Ankle:  3+    Toes/plantar response:  withdrawl    L  Biceps:  3+    Triceps:  3+    Brachioradialis:  3+    Patellar:  3+    Ankle:  3+    Toes/plantar response:  withdrawl    Coordination - Positional/ physiologic appearing tremors appreciated    Gait and station - Gait not tested due to patient safety issues    Labs:                        10.5   12.47 )-----------( 220      ( 30 Sep 2023 09:04 )             34.6           CAPILLARY BLOOD GLUCOSE      POCT Blood Glucose.: 186 mg/dL (30 Sep 2023 08:54)        PT/INR - ( 30 Sep 2023 09:04 )   PT: 11.7 sec;   INR: 1.05 ratio         PTT - ( 30 Sep 2023 09:04 )  PTT:30.1 sec  CSF:                  Radiology:    < from: CT Brain Perfusion Maps Stroke (09.30.23 @ 09:17) >  IMPRESSION:    CT PERFUSION:  No at risk ischemic tissue or core infarct identified.    CTA BRAIN:  No large vessel occlusion.  Stenoses of the right ICA, left distal MITRA and left distal PCA.    CTA NECK:  No hemodynamically significant stenosis.    < end of copied text >  < from: CT Brain Stroke Protocol (09.30.23 @ 09:17) >  IMPRESSION:  No acute intra-cranial hemorrhage, mass effect, or midline shift.    < end of copied text >     Neurology - Consult Note    -  Spectra: [phone] ([facility]), [phone] ([facility])  -    HPI: RADHA CRUZ, 89y (26-Mar-1933) F w/ PMHx significant for dementia with behavioral disturbance and borderline HTN on low dose lisinopril presents to the ED for unresponsiveness and whole body stiffening. Stroke code called on arrival. LKW 7:00 AM 9/30/23. Patient was woken by her daughter and nursing aid as she is every morning and it was noted that she had urinated the bed. Her daughter and aid were helping her up towards the toilet when suddenly her whole body stiffened. She was close to the toilet when she stiffened and was quickly manually sat down with stiffened and hunched body and contracted limbs on the toilet where within seconds she had a bowel movement. She was unresponsive and staring throughout this episode. EMS was called and the patient's body remained contracted and hunched until EMS arrived about 15 minutes later at which point she was noted by EMS to have AMS, unresponsive and not following command with a stiff and contracted left arm. Family reports the patient was unable to move herself and required 2 person assistance for any movements during this episode.  Of note, patient's family reports she had been urinating multiple times the day and night prior to this episode which is not normal for her. Patient has a baseline mental status of A&Ox 1 - 2, occasionally having issues orientation to time and occasionally location. ED Stroke CT workup was negative for acute findings. Patient is currently unable to consistently follow verbal command and is not responding to any questions. Upon re-examination 1.5 hrs after initial stroke examination, patient is now able to follow commands, occasionally agitated with difficult to understand speech. Per family, patient has difficult to understand speech at baseline. Unable to obtain ROS due to speech and mental status    NIHSS: 17  preMRS: 5 (Constant nursing care for safety 2/2 dementia, walks with walker)  Patient was not a thrombolytic candidate as seizure is a more likely diagnosis  Patient was not a thrombectomy candidate as there was no LVO on imaging       Review of Systems:   See HPI    Allergies:  No Known Allergies      PMHx/PSHx/Family Hx: As above, otherwise see below   HTN (hypertension)    Syncope        Social Hx:  No current use of tobacco, alcohol, or illicit drugs      Medications:  MEDICATIONS  (STANDING):    MEDICATIONS  (PRN):      Vitals:  T(C): 36.1 (09-30-23 @ 08:53), Max: 36.1 (09-30-23 @ 08:53)  HR: 83 (09-30-23 @ 08:53) (83 - 83)  BP: 172/61 (09-30-23 @ 08:53) (172/61 - 172/61)  RR: 20 (09-30-23 @ 08:53) (20 - 20)  SpO2: 92% (09-30-23 @ 08:53) (92% - 92%)    Physical Examination:  General - Open mouth with dry tongue.  Cardiovascular - Peripheral pulses palpable, no edema  Eyes - Fundoscopy not performed due to safety precautions in the setting of infection risk    Neurologic Exam:  Mental status - A&O x 1 to self, not to location or time. Speech fluent but unintelligible, repetition and naming difficult to assess due to speech. Follows simple but not complex commands. Attention/concentration intact    Cranial nerves - PERRLA, VFF, EOMI, face sensation (V1-V3) intact b/l, facial strength intact without asymmetry b/l, hearing intact b/l, palate with symmetric elevation, trapezius 5/5 strength b/l, tongue midline on protrusion with full lateral movement    Motor - Cachectic appearing with decreased muscle bulk. No pronator drift.  Strength testing            R        Deltoid:  5    Biceps:  5    Triceps:  5    Wrist Extension:  5    Wrist Flexion:  5    Interossei:  5    :  5    Hip Flexion:  5    Hip Extension:  5    Knee Flexion:  5    Knee Extension:  5    Dorsiflexion:  5    Plantar Flexion:  5        L        Deltoid:  5    Biceps:  5    Triceps:  5    Wrist Extension:  5    Wrist Flexion:  5    Interossei:  5    :  5    Hip Flexion:  5    Hip Extension:  5    Knee Flexion:  5    Knee Extension:  5    Dorsiflexion:  5    Plantar Flexion:  5      Sensation - Light touch/temperature - not reliable    DTR's -               R  Biceps:  3+    Triceps:  3+    Brachioradialis:  3+    Patellar:  3+    Ankle:  3+    Toes/plantar response:  withdrawl    L  Biceps:  3+    Triceps:  3+    Brachioradialis:  3+    Patellar:  3+    Ankle:  3+    Toes/plantar response:  withdrawl    Coordination - Positional/ physiologic appearing tremors appreciated    Gait and station - Gait not tested due to patient safety issues    Labs:                        10.5   12.47 )-----------( 220      ( 30 Sep 2023 09:04 )             34.6           CAPILLARY BLOOD GLUCOSE      POCT Blood Glucose.: 186 mg/dL (30 Sep 2023 08:54)        PT/INR - ( 30 Sep 2023 09:04 )   PT: 11.7 sec;   INR: 1.05 ratio         PTT - ( 30 Sep 2023 09:04 )  PTT:30.1 sec  CSF:                  Radiology:    < from: CT Brain Perfusion Maps Stroke (09.30.23 @ 09:17) >  IMPRESSION:    CT PERFUSION:  No at risk ischemic tissue or core infarct identified.    CTA BRAIN:  No large vessel occlusion.  Stenoses of the right ICA, left distal MITRA and left distal PCA.    CTA NECK:  No hemodynamically significant stenosis.    < end of copied text >  < from: CT Brain Stroke Protocol (09.30.23 @ 09:17) >  IMPRESSION:  No acute intra-cranial hemorrhage, mass effect, or midline shift.    < end of copied text >

## 2023-09-30 NOTE — ED ADULT NURSE REASSESSMENT NOTE - NS ED NURSE REASSESS COMMENT FT1
Brit RN: Unable to perform dysphagia screening at this time. Patient not following commands and not as alert as baseline.
Patient resting in stretcher, no acute distress noted at this time. Patient updated on plan of care, will continue to monitor.
Break covering RN: patient received, at baseline mental status, appears to be resting comfortably in bed, no complaints noted at this time. Breathing even and unlabored, pallor/diaphoresis not noted.
Patient resting in stretcher, no acute distress noted at this time. Patient updated on plan of care, will continue to monitor.

## 2023-09-30 NOTE — ED ADULT NURSE NOTE - NSFALLHARMRISKINTERV_ED_ALL_ED

## 2023-09-30 NOTE — STROKE CODE NOTE - MRS SCORE
(2) Slight disablitiy.  Able to look after own affairs without assistance, but unable to carry out all previous activities. (5) Severe disability. Requires constant nursing care and attention, bedridden, incontent.

## 2023-09-30 NOTE — H&P ADULT - PROBLEM SELECTOR PLAN 1
Assessment:  - patient presented for new onset body stiffness and staring, concerning for new onset seizure  - CT head neg for acute stroke or other acute pathology    Plan:  - MRI head with and without contrast for seizure work up  - ativan prn for now  - seizure precautions  - NPO pending dysphagia eval  - will send dementia labs as per neuro  - fall risk protocol, aspiration precautions  - EEG pending for seizure work up

## 2023-09-30 NOTE — ED ADULT NURSE NOTE - CHIEF COMPLAINT QUOTE
Pt with sudden onset weakness, speech changes, inability to walk; started at 7:15a  Pt has dementia and can be confused at baseline but  is normally able to follow commands, has good hand strength and walks without assistance; this a.m. pt sat on edge of bathtub and could not move and speech became markedly garbled; in triage pt's speech garbled, pt cannot  and  bilateral arms exhibit  ++ drift  Code stroke activated  Hx of HTN and DM type 2

## 2023-09-30 NOTE — H&P ADULT - NSHPREVIEWOFSYSTEMS_GEN_ALL_CORE
REVIEW OF SYSTEMS:    CONSTITUTIONAL: No weakness, fevers or chills  EYES/ENT: No visual changes;  No dysphagia; No sore throat; No rhinorrhea; No sinus pain/pressure  NECK: No pain or stiffness  RESPIRATORY: + cough, no wheezing, hemoptysis; No shortness of breath  CARDIOVASCULAR: No chest pain or palpitations; No lower extremity edema  GASTROINTESTINAL: No abdominal or epigastric pain. No nausea, vomiting, or hematemesis; No diarrhea or constipation. No melena or hematochezia.  GENITOURINARY: No dysuria, frequency or hematuria  NEUROLOGICAL: No numbness or weakness  MSK: ambulates with walker  SKIN: No itching, burning, rashes, or lesions   All other review of systems is negative unless indicated above.

## 2023-09-30 NOTE — H&P ADULT - CONVERSATION DETAILS
The patient has dementia, cannot have GOC discussion at this time.    Spoken with the daughter on the phone, Raj, who is the primary caretaker and decision maker, and claims she is the HCP, however does not have paper work at this time. She also stated that her other sisters do not help make decisions. One of the sisters were at bedside, Emi, who deferred all healthcare related questions to raj.    I explained to the patient and/or family member what cardiopulmonary resuscitation is. Explained the risks and benefits of CPR such has hypoxic brain injury and rib fractures, and low likelihood of survival. I also explained to the patient the risks and benefits of artifical ventilation such as failure to wean off mechanical ventilation.    Raj states that she does not want her mother to be a vegetable and would like her to pass away peacefully. She is requesting DNR/DNI.     MOLST form completed in the chart. DNR/DNI order placed.

## 2023-09-30 NOTE — ED PROVIDER NOTE - PHYSICAL EXAMINATION
Gen: NAD, unable to make needs known, non-toxic  Head: NCAT  HEENT: EOMI, normal conjunctiva  Lung: CTAB, no respiratory distress, no wheezes/rhonchi/rales B/L  CV: RRR, pulses bilaterally   Abd: soft, no facial indications of abdominal tenderness, ND, no guarding  MSK: no visible bony deformities  Neuro: unable to assess motor or sensory deficits   Skin: Warm, well perfused, no rash

## 2023-09-30 NOTE — ED PROVIDER NOTE - CLINICAL SUMMARY MEDICAL DECISION MAKING FREE TEXT BOX
Toby, PGY3 - 89-year-old woman with PMH dementia, HTN, presenting due to an episode of stiffness that started at around 7:15 AM, is slowly resolving at this time.  Patient does not have systemic symptoms of infection, family notes she had increased urinary incontinence since last night, high suspicion for urinary tract infection that may be causing altered mental status.  Based on the series of events that occurred, for example patient awakening at her normal mental state and ambulating to the bathroom at which point she had the stiffness that continued to be present while she got up and down from a sitting position, low suspicion for cerebrovascular accident or seizure activity at this time.  Labs, CAT scans, urine and urine culture, reassess.  Patient most likely to be admitted for further follow-up. *The above represents an initial assessment/impression. Please refer to progress notes for potential changes in patient clinical course*

## 2023-09-30 NOTE — ED ADULT TRIAGE NOTE - CHIEF COMPLAINT QUOTE
Pt with sudden onset weakness, speech changes, inability to walk; started at 7:15a  Pt has dementia and can be confused at baseline but  is normally able to follow commands, has good hand strength and walks without assistance; this a.m. pt sat on edge of bathtub and could not move and speech became markedly garbled   Code stroke activated  Hx of HTN and DM type 2 Pt with sudden onset weakness, speech changes, inability to walk; started at 7:15a  Pt has dementia and can be confused at baseline but  is normally able to follow commands, has good hand strength and walks without assistance; this a.m. pt sat on edge of bathtub and could not move and speech became markedly garbled; in triage pt's speech garbled, pt cannot  and  bilateral arms exhibit  ++ drift  Code stroke activated  Hx of HTN and DM type 2

## 2023-09-30 NOTE — H&P ADULT - NSHPLABSRESULTS_GEN_ALL_CORE
10.5   12.47 )-----------( 220      ( 30 Sep 2023 09:04 )             34.6       09-30    138  |  97<L>  |  16  ----------------------------<  202<H>  4.6   |  24  |  0.85    Ca    9.5      30 Sep 2023 09:04    TPro  8.1  /  Alb  4.4  /  TBili  0.9  /  DBili  x   /  AST  27  /  ALT  14  /  AlkPhos  83  09-30      CARDIAC MARKERS ( 30 Sep 2023 11:02 )  x     / x     / 252 U/L / x     / x            PT/INR - ( 30 Sep 2023 09:04 )   PT: 11.7 sec;   INR: 1.05 ratio         PTT - ( 30 Sep 2023 09:04 )  PTT:30.1 sec        Urinalysis Basic - ( 30 Sep 2023 10:41 )    Color: Yellow / Appearance: Turbid / S.051 / pH: x  Gluc: x / Ketone: Negative mg/dL  / Bili: Negative / Urobili: 0.2 mg/dL   Blood: x / Protein: Trace mg/dL / Nitrite: Negative   Leuk Esterase: Negative / RBC: 4 /HPF / WBC 2 /HPF   Sq Epi: x / Non Sq Epi: 1 /HPF / Bacteria: Occasional /HPF            CXR: As per my read - no opacities noted, Will wait for prelim/official read    EKG: As per my read - pending 10.5   12.47 )-----------( 220      ( 30 Sep 2023 09:04 )             34.6       09-30    138  |  97<L>  |  16  ----------------------------<  202<H>  4.6   |  24  |  0.85    Ca    9.5      30 Sep 2023 09:04    TPro  8.1  /  Alb  4.4  /  TBili  0.9  /  DBili  x   /  AST  27  /  ALT  14  /  AlkPhos  83  09-30      CARDIAC MARKERS ( 30 Sep 2023 11:02 )  x     / x     / 252 U/L / x     / x            PT/INR - ( 30 Sep 2023 09:04 )   PT: 11.7 sec;   INR: 1.05 ratio         PTT - ( 30 Sep 2023 09:04 )  PTT:30.1 sec        Urinalysis Basic - ( 30 Sep 2023 10:41 )    Color: Yellow / Appearance: Turbid / S.051 / pH: x  Gluc: x / Ketone: Negative mg/dL  / Bili: Negative / Urobili: 0.2 mg/dL   Blood: x / Protein: Trace mg/dL / Nitrite: Negative   Leuk Esterase: Negative / RBC: 4 /HPF / WBC 2 /HPF   Sq Epi: x / Non Sq Epi: 1 /HPF / Bacteria: Occasional /HPF            CXR: As per my read - no opacities noted, Will wait for prelim/official read    EKG: As per my read - NSR no ST changes QTc 475

## 2023-09-30 NOTE — ED PROVIDER NOTE - OBJECTIVE STATEMENT
89-year-old woman with PMH dementia, hypertension, presenting an episode of stiffness that started earlier today.  Per daughter and caregiver at bedside, they state that patient usually has a small amount of urine on the bed after she wakes up, however today there was an increased amount of urine.  Patient was at her usual state of activity at that time (7am), appropriately having a conversation at her baseline.  15 minutes later when she went to the bathroom she had a sudden onset stiffness of the bilateral upper and lower extremities, after a few minutes was able to be seated on the toilet to have a bowel movement while still stiff.  When patient managed to lift herself up again she continued to have stiffness and at that time EMS was called.  Family denies any prior fevers or recent illnesses, notes that patient has been having increased urinary frequency since last night.  Patient at this time is not able to answer questions but appears to be able to copy actions and movements that she sees around her.  Responsive to pain of the bilateral upper extremities.  On triage vital signs were nonactionable.

## 2023-10-01 DIAGNOSIS — D50.9 IRON DEFICIENCY ANEMIA, UNSPECIFIED: ICD-10-CM

## 2023-10-01 DIAGNOSIS — D72.829 ELEVATED WHITE BLOOD CELL COUNT, UNSPECIFIED: ICD-10-CM

## 2023-10-01 LAB
AMPHET UR-MCNC: NEGATIVE — SIGNIFICANT CHANGE UP
APAP SERPL-MCNC: <10 UG/ML — LOW (ref 15–25)
BARBITURATES UR SCN-MCNC: NEGATIVE — SIGNIFICANT CHANGE UP
BENZODIAZ UR-MCNC: NEGATIVE — SIGNIFICANT CHANGE UP
CK SERPL-CCNC: 690 U/L — HIGH (ref 25–170)
COCAINE METAB.OTHER UR-MCNC: NEGATIVE — SIGNIFICANT CHANGE UP
CREATININE URINE RESULT, DAU: 99 MG/DL — SIGNIFICANT CHANGE UP
ETHANOL SERPL-MCNC: <10 MG/DL — SIGNIFICANT CHANGE UP
METHADONE UR-MCNC: NEGATIVE — SIGNIFICANT CHANGE UP
OPIATES UR-MCNC: NEGATIVE — SIGNIFICANT CHANGE UP
OXYCODONE UR-MCNC: NEGATIVE — SIGNIFICANT CHANGE UP
PCP SPEC-MCNC: SIGNIFICANT CHANGE UP
PCP UR-MCNC: NEGATIVE — SIGNIFICANT CHANGE UP
SALICYLATES SERPL-MCNC: <0.3 MG/DL — LOW (ref 15–30)
T4 FREE SERPL-MCNC: 1.3 NG/DL — SIGNIFICANT CHANGE UP (ref 0.9–1.8)
THC UR QL: NEGATIVE — SIGNIFICANT CHANGE UP
TOXICOLOGY SCREEN, DRUGS OF ABUSE, SERUM RESULT: SIGNIFICANT CHANGE UP

## 2023-10-01 PROCEDURE — 99233 SBSQ HOSP IP/OBS HIGH 50: CPT

## 2023-10-01 RX ORDER — INFLUENZA VIRUS VACCINE 15; 15; 15; 15 UG/.5ML; UG/.5ML; UG/.5ML; UG/.5ML
0.7 SUSPENSION INTRAMUSCULAR ONCE
Refills: 0 | Status: DISCONTINUED | OUTPATIENT
Start: 2023-10-01 | End: 2023-10-04

## 2023-10-01 RX ORDER — ACETAMINOPHEN 500 MG
1000 TABLET ORAL ONCE
Refills: 0 | Status: COMPLETED | OUTPATIENT
Start: 2023-10-01 | End: 2023-10-01

## 2023-10-01 RX ADMIN — Medication 1000 MILLIGRAM(S): at 21:43

## 2023-10-01 RX ADMIN — HEPARIN SODIUM 5000 UNIT(S): 5000 INJECTION INTRAVENOUS; SUBCUTANEOUS at 17:35

## 2023-10-01 RX ADMIN — Medication 400 MILLIGRAM(S): at 20:43

## 2023-10-01 NOTE — PROGRESS NOTE ADULT - TIME BILLING
I have spent a total of greater than 76 minutes time spent to prepare to see the patient, including the Emergency room charts and ED progress notes, obtaining and reviewing history, physical examination, explaining the diagnosis, prognosis and treatment plan with the patient/family/caregiver. I also have spent the time ordering studies and testing, interpreting results, medicine reconciliation, subspecialty consultations as indicated and documentation as above. Time spent includes direct patient care  (interview and examination of patient), discussion with other providers, support staff and/or patient's family members, review of medical records, ordering diagnostic tests and analyzing results, and documentation.

## 2023-10-01 NOTE — PATIENT PROFILE ADULT - FALL HARM RISK - HARM RISK INTERVENTIONS

## 2023-10-02 LAB
ANION GAP SERPL CALC-SCNC: 13 MMOL/L — SIGNIFICANT CHANGE UP (ref 7–14)
BUN SERPL-MCNC: 17 MG/DL — SIGNIFICANT CHANGE UP (ref 7–23)
CALCIUM SERPL-MCNC: 9 MG/DL — SIGNIFICANT CHANGE UP (ref 8.4–10.5)
CHLORIDE SERPL-SCNC: 101 MMOL/L — SIGNIFICANT CHANGE UP (ref 98–107)
CO2 SERPL-SCNC: 19 MMOL/L — LOW (ref 22–31)
CREAT SERPL-MCNC: 0.73 MG/DL — SIGNIFICANT CHANGE UP (ref 0.5–1.3)
CULTURE RESULTS: SIGNIFICANT CHANGE UP
EGFR: 78 ML/MIN/1.73M2 — SIGNIFICANT CHANGE UP
GLUCOSE SERPL-MCNC: 102 MG/DL — HIGH (ref 70–99)
HCT VFR BLD CALC: 32.4 % — LOW (ref 34.5–45)
HGB BLD-MCNC: 9.8 G/DL — LOW (ref 11.5–15.5)
MAGNESIUM SERPL-MCNC: 2 MG/DL — SIGNIFICANT CHANGE UP (ref 1.6–2.6)
MCHC RBC-ENTMCNC: 19.2 PG — LOW (ref 27–34)
MCHC RBC-ENTMCNC: 30.2 GM/DL — LOW (ref 32–36)
MCV RBC AUTO: 63.4 FL — LOW (ref 80–100)
NRBC # BLD: 0 /100 WBCS — SIGNIFICANT CHANGE UP (ref 0–0)
NRBC # FLD: 0 K/UL — SIGNIFICANT CHANGE UP (ref 0–0)
PHOSPHATE SERPL-MCNC: 2.8 MG/DL — SIGNIFICANT CHANGE UP (ref 2.5–4.5)
PLATELET # BLD AUTO: 203 K/UL — SIGNIFICANT CHANGE UP (ref 150–400)
POTASSIUM SERPL-MCNC: 4.3 MMOL/L — SIGNIFICANT CHANGE UP (ref 3.5–5.3)
POTASSIUM SERPL-SCNC: 4.3 MMOL/L — SIGNIFICANT CHANGE UP (ref 3.5–5.3)
RBC # BLD: 5.11 M/UL — SIGNIFICANT CHANGE UP (ref 3.8–5.2)
RBC # FLD: 17.4 % — HIGH (ref 10.3–14.5)
SODIUM SERPL-SCNC: 133 MMOL/L — LOW (ref 135–145)
SPECIMEN SOURCE: SIGNIFICANT CHANGE UP
T PALLIDUM AB TITR SER: NEGATIVE — SIGNIFICANT CHANGE UP
WBC # BLD: 10.5 K/UL — SIGNIFICANT CHANGE UP (ref 3.8–10.5)
WBC # FLD AUTO: 10.5 K/UL — SIGNIFICANT CHANGE UP (ref 3.8–10.5)

## 2023-10-02 PROCEDURE — 99233 SBSQ HOSP IP/OBS HIGH 50: CPT

## 2023-10-02 PROCEDURE — 70553 MRI BRAIN STEM W/O & W/DYE: CPT | Mod: 26

## 2023-10-02 RX ADMIN — HEPARIN SODIUM 5000 UNIT(S): 5000 INJECTION INTRAVENOUS; SUBCUTANEOUS at 05:00

## 2023-10-02 RX ADMIN — HEPARIN SODIUM 5000 UNIT(S): 5000 INJECTION INTRAVENOUS; SUBCUTANEOUS at 17:44

## 2023-10-02 NOTE — PHYSICAL THERAPY INITIAL EVALUATION ADULT - GENERAL OBSERVATIONS, REHAB EVAL
Patient received semi-supine in no apparent distress, +telemetry. BERNIE Guerra cleared patient. 100% oxygen saturation.

## 2023-10-02 NOTE — PROGRESS NOTE ADULT - TIME BILLING
- Ordering, reviewing, and interpreting labs, testing, and imaging.  - Independently obtaining a review of systems and performing a physical exam  - Reviewing prior hospitalization and where necessary, outpatient records.  - Reviewing consultant recommendations/communicating with consultants  - Counselling and educating patient and family regarding interpretation of aforementioned items and plan of care.  - Communicating care plan with ACP/Resident/RN/SW/CM

## 2023-10-02 NOTE — PHYSICAL THERAPY INITIAL EVALUATION ADULT - ADDITIONAL COMMENTS
Patient is poor historian. Prior level of function and social obtained from documentation and RN. Patient is reportedly ambulatory with assistance, lives with daughter, and has a home health aide. Able to answer questions with "yes" and "no" at baseline.    Patient left semi-supine in no apparent distress, +lines intact.

## 2023-10-02 NOTE — PHYSICAL THERAPY INITIAL EVALUATION ADULT - ORIENTATION, REHAB EVAL
Patient provided minimal verbal responses, unclear if A&O x0-1./not oriented to person, place, time or situation

## 2023-10-02 NOTE — PHYSICAL THERAPY INITIAL EVALUATION ADULT - NSPTDISCHREC_GEN_A_CORE
Pending. Patient to be placed on PT program trial. Pending further assessment of mobility and participation in skilled PT session. Patient to be placed on PT program trial at this time.

## 2023-10-02 NOTE — PHYSICAL THERAPY INITIAL EVALUATION ADULT - PERTINENT HX OF CURRENT PROBLEM, REHAB EVAL
Patient is a 89 year old female with pmhx of dementia presented to the ED for new onset leg stiffness and staring, possible seizure pending MRI. The patient was getting up to go to the bathroom however when she sat down, the patient could not get up from the toilet. The patient was being assisted by the home health aide and noted that her legs were stiff.  CT head neg for acute stroke or other acute pathology.

## 2023-10-03 LAB
ANION GAP SERPL CALC-SCNC: 13 MMOL/L — SIGNIFICANT CHANGE UP (ref 7–14)
BUN SERPL-MCNC: 22 MG/DL — SIGNIFICANT CHANGE UP (ref 7–23)
CALCIUM SERPL-MCNC: 8.8 MG/DL — SIGNIFICANT CHANGE UP (ref 8.4–10.5)
CHLORIDE SERPL-SCNC: 103 MMOL/L — SIGNIFICANT CHANGE UP (ref 98–107)
CO2 SERPL-SCNC: 21 MMOL/L — LOW (ref 22–31)
CREAT SERPL-MCNC: 0.8 MG/DL — SIGNIFICANT CHANGE UP (ref 0.5–1.3)
EGFR: 70 ML/MIN/1.73M2 — SIGNIFICANT CHANGE UP
FERRITIN SERPL-MCNC: 198 NG/ML — SIGNIFICANT CHANGE UP (ref 13–330)
GLUCOSE SERPL-MCNC: 119 MG/DL — HIGH (ref 70–99)
HCT VFR BLD CALC: 30 % — LOW (ref 34.5–45)
HEMOGLOBIN INTERPRETATION: SIGNIFICANT CHANGE UP
HGB A MFR BLD: 95.1 % — SIGNIFICANT CHANGE UP (ref 95–97.6)
HGB A2 MFR BLD: 4.4 % — HIGH (ref 2.4–3.5)
HGB BLD-MCNC: 9.3 G/DL — LOW (ref 11.5–15.5)
HGB F MFR BLD: <1 % — SIGNIFICANT CHANGE UP (ref 0–1.5)
IRON SATN MFR SERPL: 17 UG/DL — LOW (ref 30–160)
IRON SATN MFR SERPL: 7 % — LOW (ref 14–50)
MAGNESIUM SERPL-MCNC: 2.2 MG/DL — SIGNIFICANT CHANGE UP (ref 1.6–2.6)
MCHC RBC-ENTMCNC: 19.6 PG — LOW (ref 27–34)
MCHC RBC-ENTMCNC: 31 GM/DL — LOW (ref 32–36)
MCV RBC AUTO: 63.2 FL — LOW (ref 80–100)
NRBC # BLD: 0 /100 WBCS — SIGNIFICANT CHANGE UP (ref 0–0)
NRBC # FLD: 0 K/UL — SIGNIFICANT CHANGE UP (ref 0–0)
PHOSPHATE SERPL-MCNC: 2.5 MG/DL — SIGNIFICANT CHANGE UP (ref 2.5–4.5)
PLATELET # BLD AUTO: 199 K/UL — SIGNIFICANT CHANGE UP (ref 150–400)
POTASSIUM SERPL-MCNC: 4 MMOL/L — SIGNIFICANT CHANGE UP (ref 3.5–5.3)
POTASSIUM SERPL-SCNC: 4 MMOL/L — SIGNIFICANT CHANGE UP (ref 3.5–5.3)
RBC # BLD: 4.75 M/UL — SIGNIFICANT CHANGE UP (ref 3.8–5.2)
RBC # FLD: 17.4 % — HIGH (ref 10.3–14.5)
SODIUM SERPL-SCNC: 137 MMOL/L — SIGNIFICANT CHANGE UP (ref 135–145)
TIBC SERPL-MCNC: 248 UG/DL — SIGNIFICANT CHANGE UP (ref 220–430)
UIBC SERPL-MCNC: 231 UG/DL — SIGNIFICANT CHANGE UP (ref 110–370)
WBC # BLD: 12.49 K/UL — HIGH (ref 3.8–10.5)
WBC # FLD AUTO: 12.49 K/UL — HIGH (ref 3.8–10.5)

## 2023-10-03 PROCEDURE — 99233 SBSQ HOSP IP/OBS HIGH 50: CPT

## 2023-10-03 PROCEDURE — 95718 EEG PHYS/QHP 2-12 HR W/VEEG: CPT

## 2023-10-03 RX ADMIN — Medication 650 MILLIGRAM(S): at 13:17

## 2023-10-03 RX ADMIN — HEPARIN SODIUM 5000 UNIT(S): 5000 INJECTION INTRAVENOUS; SUBCUTANEOUS at 17:07

## 2023-10-03 RX ADMIN — Medication 650 MILLIGRAM(S): at 14:19

## 2023-10-03 NOTE — PROGRESS NOTE ADULT - ASSESSMENT
89F dementia presented to the ED for new onset leg stiffness noted to have enterovirus, concern for possible seizure.
89F dementia presented to the ED for new onset leg stiffness noted to have enterovirus, concern for possible seizure.     Physical Exam:  GENERAL: no apparent distress  CHEST/LUNG: on RA; Clear to auscultation bilaterally; No wheezing; No crackles  HEART: Regular rate and rhythm; S1/S2 wnl; no obvious murmurs  ABDOMEN: Soft, Nontender, Nondistended; Bowel sounds present  EXTREMITIES:  2+ Peripheral Pulses, No edema  PSYCH: normal affect, calm demeanor
89F dementia presented to the ED for new onset leg stiffness noted to have enterovirus, concern for possible seizure.     Physical Exam:  GENERAL: no apparent distress  CHEST/LUNG: on RA; Clear to auscultation bilaterally; No wheezing; No crackles  HEART: Regular rate and rhythm; S1/S2 wnl; no obvious murmurs  ABDOMEN: Soft, Nontender, Nondistended; Bowel sounds present  EXTREMITIES:  2+ Peripheral Pulses, No edema  PSYCH: normal affect, calm demeanor

## 2023-10-03 NOTE — PROGRESS NOTE ADULT - SUBJECTIVE AND OBJECTIVE BOX
[facility] Division of Hospital Medicine  Juan Wills MD  In House Pager [phone]    Patient is a 89y old  Female who presents with a chief complaint of stiffness (30 Sep 2023 22:17)    SUBJECTIVE / OVERNIGHT EVENTS: no acute events. patient w/o acute complaints.     ROS: unable to obtain due to dementia.     MEDICATIONS  (STANDING):  heparin   Injectable 5000 Unit(s) SubCutaneous every 12 hours  influenza  Vaccine (HIGH DOSE) 0.7 milliLiter(s) IntraMuscular once    MEDICATIONS  (PRN):  acetaminophen     Tablet .. 650 milliGRAM(s) Oral every 6 hours PRN Temp greater or equal to 38C (100.4F), Mild Pain (1 - 3)  LORazepam   Injectable 1 milliGRAM(s) IV Push every 15 minutes PRN seizures  melatonin 3 milliGRAM(s) Oral at bedtime PRN Insomnia    CAPILLARY BLOOD GLUCOSE        I&O's Summary      Vital Signs Last 24 Hrs  T(C): 37.1 (02 Oct 2023 12:26), Max: 39.2 (01 Oct 2023 20:20)  T(F): 98.7 (02 Oct 2023 12:26), Max: 102.6 (01 Oct 2023 20:20)  HR: 80 (02 Oct 2023 12:26) (75 - 102)  BP: 159/58 (02 Oct 2023 12:26) (140/55 - 186/79)  BP(mean): --  RR: 18 (02 Oct 2023 12:26) (16 - 18)  SpO2: 98% (02 Oct 2023 12:26) (95% - 100%)    Parameters below as of 02 Oct 2023 12:26  Patient On (Oxygen Delivery Method): room air        LABS:                        9.8    10.50 )-----------( 203      ( 02 Oct 2023 06:25 )             32.4     10-02    133<L>  |  101  |  17  ----------------------------<  102<H>  4.3   |  19<L>  |  0.73    Ca    9.0      02 Oct 2023 06:25  Phos  2.8     10-02  Mg     2.00     10-02        CARDIAC MARKERS ( 01 Oct 2023 05:45 )  x     / x     / 690 U/L / x     / x          Urinalysis Basic - ( 02 Oct 2023 06:25 )    Color: x / Appearance: x / SG: x / pH: x  Gluc: 102 mg/dL / Ketone: x  / Bili: x / Urobili: x   Blood: x / Protein: x / Nitrite: x   Leuk Esterase: x / RBC: x / WBC x   Sq Epi: x / Non Sq Epi: x / Bacteria: x        RADIOLOGY & ADDITIONAL TESTS:  Results Reviewed: Y  Imaging Personally Reviewed: Y  Electrocardiogram Personally Reviewed: Y    COORDINATION OF CARE:  Care Discussed with Consultants/Other Providers [Y/N]: Y  Prior or Outpatient Records Reviewed [Y/N]: Y  
[facility] Division of Hospital Medicine  Juan Wills MD  In House Pager [phone]    Patient is a 89y old  Female who presents with a chief complaint of stiffness (02 Oct 2023 14:01)    SUBJECTIVE / OVERNIGHT EVENTS: no acute events. feels well. no complaints.     ROS: Denied Fever, Chill, CP, SOB, Abd pain, N/V/D, LE swelling or pain.     MEDICATIONS  (STANDING):  heparin   Injectable 5000 Unit(s) SubCutaneous every 12 hours  influenza  Vaccine (HIGH DOSE) 0.7 milliLiter(s) IntraMuscular once    MEDICATIONS  (PRN):  acetaminophen     Tablet .. 650 milliGRAM(s) Oral every 6 hours PRN Temp greater or equal to 38C (100.4F), Mild Pain (1 - 3)  LORazepam   Injectable 1 milliGRAM(s) IV Push every 15 minutes PRN seizures  melatonin 3 milliGRAM(s) Oral at bedtime PRN Insomnia    CAPILLARY BLOOD GLUCOSE        I&O's Summary      Vital Signs Last 24 Hrs  T(C): 36.7 (03 Oct 2023 06:20), Max: 37.7 (02 Oct 2023 18:00)  T(F): 98.1 (03 Oct 2023 06:20), Max: 99.9 (02 Oct 2023 18:00)  HR: 79 (03 Oct 2023 06:20) (79 - 85)  BP: 139/56 (03 Oct 2023 06:20) (138/76 - 140/60)  BP(mean): --  RR: 18 (03 Oct 2023 06:20) (16 - 18)  SpO2: 98% (03 Oct 2023 06:20) (97% - 98%)    Parameters below as of 03 Oct 2023 06:20  Patient On (Oxygen Delivery Method): room air        LABS:                        9.3    12.49 )-----------( 199      ( 03 Oct 2023 06:15 )             30.0     10-03    137  |  103  |  22  ----------------------------<  119<H>  4.0   |  21<L>  |  0.80    Ca    8.8      03 Oct 2023 06:15  Phos  2.5     10-03  Mg     2.20     10-03            Urinalysis Basic - ( 03 Oct 2023 06:15 )    Color: x / Appearance: x / SG: x / pH: x  Gluc: 119 mg/dL / Ketone: x  / Bili: x / Urobili: x   Blood: x / Protein: x / Nitrite: x   Leuk Esterase: x / RBC: x / WBC x   Sq Epi: x / Non Sq Epi: x / Bacteria: x        RADIOLOGY & ADDITIONAL TESTS:  Results Reviewed: Y  Imaging Personally Reviewed: Y  Electrocardiogram Personally Reviewed: Y    COORDINATION OF CARE:  Care Discussed with Consultants/Other Providers [Y/N]: Y  Prior or Outpatient Records Reviewed [Y/N]: Y  
Patient is a 89y old  Female who presents with a chief complaint of stiffness (30 Sep 2023 22:17)    SUBJECTIVE / OVERNIGHT EVENTS:    unable to report ROS due to dementia  says thank you, follows basic commands, seems Elim IRA    MEDICATIONS  (STANDING):  heparin   Injectable 5000 Unit(s) SubCutaneous every 12 hours  influenza  Vaccine (HIGH DOSE) 0.7 milliLiter(s) IntraMuscular once    MEDICATIONS  (PRN):  acetaminophen     Tablet .. 650 milliGRAM(s) Oral every 6 hours PRN Temp greater or equal to 38C (100.4F), Mild Pain (1 - 3)  LORazepam   Injectable 1 milliGRAM(s) IV Push every 15 minutes PRN seizures  melatonin 3 milliGRAM(s) Oral at bedtime PRN Insomnia    T(C): 36.5 (10-01-23 @ 06:05), Max: 37.2 (09-30-23 @ 12:50)  HR: 78 (10-01-23 @ 06:05) (65 - 78)  BP: 145/59 (10-01-23 @ 06:05) (139/60 - 180/75)  RR: 18 (10-01-23 @ 06:05) (18 - 18)  SpO2: 100% (10-01-23 @ 06:05) (96% - 100%)    CAPILLARY BLOOD GLUCOSE  POCT Blood Glucose.: 115 mg/dL (30 Sep 2023 18:32)  POCT Blood Glucose.: 119 mg/dL (30 Sep 2023 13:28)    PHYSICAL EXAM:  GENERAL: NAD   CHEST/LUNG: Clear to auscultation bilaterally; No wheeze  HEART: Regular rate and rhythm; No murmurs, rubs, or gallops  ABDOMEN: Soft, Nontender, Nondistended; Bowel sounds present  EXTREMITIES:   warm and well perfused, No clubbing, cyanosis, or edema  PSYCH: AAOx0, would not respond just say thank you to all questions   NEUROLOGY: non-focal    LABS:                        10.5   12.47 )-----------( 220      ( 30 Sep 2023 09:04 )             34.6     09-30    138  |  97<L>  |  16  ----------------------------<  202<H>  4.6   |  24  |  0.85    Ca    9.5      30 Sep 2023 09:04    TPro  8.1  /  Alb  4.4  /  TBili  0.9  /  DBili  x   /  AST  27  /  ALT  14  /  AlkPhos  83  09-30    PT/INR - ( 30 Sep 2023 09:04 )   PT: 11.7 sec;   INR: 1.05 ratio    PTT - ( 30 Sep 2023 09:04 )  PTT:30.1 sec    CARDIAC MARKERS ( 01 Oct 2023 05:45 )  x     / x     / 690 U/L / x     / x      CARDIAC MARKERS ( 30 Sep 2023 11:02 )  x     / x     / 252 U/L / x     / x        Care Discussed with Consultants/Other Providers:

## 2023-10-03 NOTE — PROGRESS NOTE ADULT - PROBLEM SELECTOR PLAN 1
At home had  new onset body stiffness and staring, concerning for new onset seizure  - CT head neg for acute stroke or other acute pathology  - EEG pending   - MRI head with and without contrast for seizure work up pending --can be OP, but family wishes to have that done here.   - seizure precautions, ativan prn seizure activity >2min   - fall risk protocol, aspiration precautions
At home had  new onset body stiffness and staring, concerning for new onset seizure  - CT head neg for acute stroke or other acute pathology  - MRI head with and without contrast did not reveal any structural abnormalities.   - seizure precautions, ativan prn seizure activity >2min   - fall risk protocol, aspiration precautions  - EEG pending
At home had  new onset body stiffness and staring, concerning for new onset seizure  - CT head neg for acute stroke or other acute pathology  - EEG pending   - MRI head with and without contrast for seizure work up pending --can be OP  - seizure precautions, ativan prn seizure activity >2min   - fall risk protocol, aspiration precautions

## 2023-10-03 NOTE — PROGRESS NOTE ADULT - PROBLEM SELECTOR PLAN 3
Hb 10.5 with MCV 60s  - trend, no overt bleeding  - iron level mildly low.  - start on iron supplement.
Hb 10.5 with MCV 60s  - trend, no overt bleeding  - f/u iron studies
Hb 10.5 with MCV 60s  - trend, no overt bleeding  - f/u iron studies

## 2023-10-03 NOTE — PROGRESS NOTE ADULT - PROBLEM SELECTOR PLAN 4
TSH 2.49  - hold home meds for now pending seizure work up

## 2023-10-03 NOTE — PROGRESS NOTE ADULT - PROBLEM SELECTOR PLAN 5
- symptomatic/supportive treatment for now  - monitor respiratory status  - CXR neg

## 2023-10-03 NOTE — CHART NOTE - NSCHARTNOTEFT_GEN_A_CORE
EEG preliminary read (not final) on the initial recording hour(s) = x 2    Very limited due to artifact but no obvious seizures recorded.  Final report to follow tomorrow morning after completion of study.    [facility] EEG Reading Room Ph#: [phone]  Epilepsy Answering Service after 5PM and before 8:30AM: Ph#: [phone]

## 2023-10-03 NOTE — PROGRESS NOTE ADULT - PROBLEM SELECTOR PLAN 6
SQH  DNR/DNI, [place] daughter who is the HCP

## 2023-10-03 NOTE — PROGRESS NOTE ADULT - PROBLEM SELECTOR PLAN 2
No localizing complaint, HD stable  - UA, CXR neg  - trend
No localizing complaint, HD stable  - UA neg, CXR neg  - trend, resolved.
No localizing complaint, HD stable  - UA neg, CXR neg  - trend, resolved.

## 2023-10-04 ENCOUNTER — TRANSCRIPTION ENCOUNTER (OUTPATIENT)
Age: 88
End: 2023-10-04

## 2023-10-04 VITALS
SYSTOLIC BLOOD PRESSURE: 145 MMHG | HEART RATE: 79 BPM | OXYGEN SATURATION: 95 % | TEMPERATURE: 98 F | DIASTOLIC BLOOD PRESSURE: 50 MMHG | RESPIRATION RATE: 17 BRPM

## 2023-10-04 LAB — VIT B1 SERPL-MCNC: 91 NMOL/L — SIGNIFICANT CHANGE UP (ref 66.5–200)

## 2023-10-04 PROCEDURE — 99239 HOSP IP/OBS DSCHRG MGMT >30: CPT

## 2023-10-04 RX ORDER — LISINOPRIL 2.5 MG/1
1 TABLET ORAL
Refills: 0 | DISCHARGE

## 2023-10-04 RX ADMIN — HEPARIN SODIUM 5000 UNIT(S): 5000 INJECTION INTRAVENOUS; SUBCUTANEOUS at 05:16

## 2023-10-04 NOTE — DISCHARGE NOTE PROVIDER - HOSPITAL COURSE
89F with PMH dementia presented after a suspected seizure episode at home. She underwent EEG and MRI which did not reveal any etiology for seizure. This is likely attributed to acute viral infection. No further seizure activities noted since admission. Patient is stable for discharge home.

## 2023-10-04 NOTE — DISCHARGE NOTE NURSING/CASE MANAGEMENT/SOCIAL WORK - PATIENT PORTAL LINK FT
You can access the FollowMyHealth Patient Portal offered by [facility] by registering at the following website: [email or web address]. By joining [facility]’s FollowMyHealth portal, you will also be able to view your health information using other applications (apps) compatible with our system.

## 2023-10-04 NOTE — DISCHARGE NOTE PROVIDER - CARE PROVIDER_API CALL
Terri Rodriguez  Internal Medicine  [address] [place] [address]  Phone: [phone]  Fax: [phone]  Follow Up Time:

## 2023-10-04 NOTE — DISCHARGE NOTE NURSING/CASE MANAGEMENT/SOCIAL WORK - NSDCFUADDAPPT_GEN_ALL_CORE_FT
Neurology upon discharge, Dr. Chi Foy, [address], [place], [place]; [phone]).

## 2023-10-04 NOTE — DISCHARGE NOTE PROVIDER - ATTENDING DISCHARGE PHYSICAL EXAMINATION:
Physical Exam:  GENERAL: no apparent distress  CHEST/LUNG: on RA; Clear to auscultation bilaterally; No wheezing; No crackles  HEART: Regular rate and rhythm; S1/S2 wnl; no obvious murmurs  ABDOMEN: Soft, Nontender, Nondistended; Bowel sounds present  EXTREMITIES:  2+ Peripheral Pulses, No edema  PSYCH: normal affect, calm demeanor  NEUROLOGY: Awake and alert

## 2023-10-04 NOTE — DISCHARGE NOTE PROVIDER - NSDCFUADDAPPT_GEN_ALL_CORE_FT
Neurology upon discharge, Dr. Chi Foy, [address] [facility], [place], [place]; [phone]).

## 2023-10-04 NOTE — DISCHARGE NOTE PROVIDER - NSDCCPCAREPLAN_GEN_ALL_CORE_FT
PRINCIPAL DISCHARGE DIAGNOSIS  Diagnosis: Seizure  Assessment and Plan of Treatment: acute seizure episode, likely due to viral infection. no medications were started. please follow up with your pcp and Neurology in office.

## 2023-10-04 NOTE — EEG REPORT - NS EEG TEXT BOX
RADHA CRUZ [id number]     Study Date: 10/3/23 15:08-19:51 (patient removes electrodes at 19:51)   Duration: 4 hr 43 min   --------------------------------------------------------------------------------------------------  History:  CC/ HPI Patient is a 89y old  Female who presents with a chief complaint of stiffness (03 Oct 2023 13:23)    MEDICATIONS  (STANDING):  heparin   Injectable 5000 Unit(s) SubCutaneous every 12 hours  influenza  Vaccine (HIGH DOSE) 0.7 milliLiter(s) IntraMuscular once    --------------------------------------------------------------------------------------------------  Study Interpretation:    [[[Abbreviation Key:  PDR=alpha rhythm/posterior dominant rhythm. A-P=anterior posterior.  Amplitude: ‘very low’:<20; ‘low’:20-49; ‘medium’:; ‘high’:>150uV.  Persistence for periodic/rhythmic patterns (% of epoch) ‘rare’:<1%; ‘occasional’:1-10%; ‘frequent’:10-50%; ‘abundant’:50-90%; ‘continuous’:>90%.  Persistence for sporadic discharges: ‘rare’:<1/hr; ‘occasional’:1/min-1/hr; ‘frequent’:>1/min; ‘abundant’:>1/10 sec.  RPP=rhythmic and periodic patterns; GRDA=generalized rhythmic delta activity; FIRDA=frontal intermittent GRDA; LRDA=lateralized rhythmic delta activity; TIRDA=temporal intermittent rhythmic delta activity;  LPD=PLED=lateralized periodic discharges; GPD=generalized periodic discharges; BIPDs =bilateral independent periodic discharges; Mf=multifocal; SIRPDs=stimulus induced rhythmic, periodic, or ictal appearing discharges; BIRDs=brief potentially ictal rhythmic discharges >4 Hz, lasting .5-10s; PFA (paroxysmal bursts >13 Hz or =8 Hz <10s).  Modifiers: +F=with fast component; +S=with spike component; +R=with rhythmic component.  S-B=burst suppression pattern.  Max=maximal. N1-drowsy; N2-stage II sleep; N3-slow wave sleep. SSS/BETS=small sharp spikes/benign epileptiform transients of sleep. HV=hyperventilation; PS=photic stimulation]]]    Daily EEG Visual Analysis    FINDINGS:      Background:  Continuity: Continuous  Symmetry: Symmetric  Posterior dominant rhythm (PDR): 9 Hz, reactive to eye closure. Symmetric low-amplitude frontal beta in wakefulness.  State Change: Present  Voltage: Normal  Anterior-Posterior Gradient: Present  Other background findings: None  Breach: Absent    Background Slowing:  Generalized slowing: None  Focal slowing: Occasional left frontotemporal focal polymorphic delta and theta slowing    State Changes:   Drowsiness and stage 2 sleep are not captured.    Interictal Findings:  None    Electrographic and Electroclinical seizures:  None    Other Clinical Events:  None    Activation Procedures:   Hyperventilation is not performed.    Photic stimulation is performed and does not elicit any abnormalities or change in the background.      Artifacts:  Frequent myogenic and movement artifacts are present, partly limiting interpretation.    EKG:  Single-lead EKG shows regular rhythm, occasional PACs.    EEG Classification / Summary:  Abnormal EEG in the awake state.  Left frontotemporal focal slowing.  No epileptiform abnormalities are captured.     Clinical Impression:  Left frontotemporal focal cerebral dysfunction can be structural or functional in etiology.             -------------------------------------------------------------------------------------------------------  [facility] EEG Reading Room Ph#: [phone]  Epilepsy Answering Service after 5PM and before 8:30AM: Ph#: [phone]    Akanksha Nogueira MD  Attending Physician, [facility] Epilepsy [place]

## 2023-10-04 NOTE — CHART NOTE - NSCHARTNOTEFT_GEN_A_CORE
EEG Classification / Summary:  Abnormal EEG in the awake state.  Left frontotemporal focal slowing.  No epileptiform abnormalities are captured.     Clinical Impression:  Left frontotemporal focal cerebral dysfunction can be structural or functional in etiology.     A/P  Ms. Lawrence is a 89 yo woman with a h/o dementia with episode consistent with seizure - first seizure ever and in the setting of acute medical illness - likely provoked.   We do not recommend starting antiseizure medication at this time.   Neurology signing off. Please reconsult PRN or call [facility] [phone] with any questions.  Thank you (0) independent

## 2023-10-06 NOTE — CHART NOTE - NSCHARTNOTEFT_GEN_A_CORE
Due to first time seizure in the setting of acute illness, entero/Rhinovirus, likely this possible seizure event was provoked. CT and MRI imaging of brain unremarkable for acute process. Patient and family can complete seizure work-up as an outpatient and pursue an ambulatory EEG.    Imaging results below  MRI Brain w/wo IV cont:  No hydrocephalus, mass effect, acute intracranial hemorrhage, vasogenic   edema, or acute territorial infarct.  No abnormal parenchymal, leptomeningeal or dural enhancement    CT PERFUSION: No at risk ischemic tissue or core infarct identified.    CTA BRAIN: No large vessel occlusion.  Stenoses of the right ICA, left distal MITRA and left distal PCA.    CTA NECK: No hemodynamically significant stenosis.    CTH: No acute intra-cranial hemorrhage, mass effect, or midline shift.    Plan  Can pursue EEG inpt or outpatien  Patient can follow up with Neurology upon discharge, Dr. Chi Foy, [address] [facility], [place], [place]; ([phone]).  Neurology signing off, please call [phone] for questions     Case discussed with Neurology Attending, Dr. Mohan Due to first time seizure in the setting of acute illness, entero/Rhinovirus, likely this possible seizure event was provoked. CT and MRI imaging of brain unremarkable for acute process. Patient and family can complete seizure work-up as an outpatient and pursue an ambulatory EEG.    Imaging results below  MRI Brain w/wo IV cont:  No hydrocephalus, mass effect, acute intracranial hemorrhage, vasogenic   edema, or acute territorial infarct.  No abnormal parenchymal, leptomeningeal or dural enhancement    CT PERFUSION: No at risk ischemic tissue or core infarct identified.    CTA BRAIN: No large vessel occlusion.  Stenoses of the right ICA, left distal MITRA and left distal PCA.    CTA NECK: No hemodynamically significant stenosis.    CTH: No acute intra-cranial hemorrhage, mass effect, or midline shift.    Plan  Can pursue EEG inpt or outpatient  Patient can follow up with Neurology upon discharge, Dr. Chi Foy, [address] [facility], [place], [place]; ([phone]).  Neurology signing off, please call [phone] for questions     Case discussed with Neurology Attending, Dr. Mohan  Thank you negative

## 2024-10-09 NOTE — PHYSICAL THERAPY INITIAL EVALUATION ADULT - MANUAL MUSCLE TESTING RESULTS, REHAB EVAL
Ambulatory Visit  Name: Brenda West      : 1940      MRN: [id number]  Encounter Provider: Raudel Armas MD  Encounter Date: 10/9/2024   Encounter department: [facility] [place]    Assessment & Plan  Normal pressure hydrocephalus (HCC)  Further decline in gait and balance cognition, though likely multifactorial.  After discussion with the patient and her daughter, no further adjustment of the shunt.  She will follow-up to this office on a as needed basis.    History of Present Illness   83-year-old woman accompanied by her daughter today.  Status post insertion of right frontal  shunt for normal pressure hydrocephalus which initially provided improvement in gait and balance.  Unfortunately, he, last ambulatory though this seems to be related to severe osteoarthritis in the right knee.  There is also been decline in cognition.  Per the patient's daughter, she is not interested in participating with physical therapy and is overall all become more withdrawn.  No complaints of headaches or difficulties with the shunt.  The patient's daughter indicates that the family feels that there has been a overall decline which is likely multifactorial as opposed to related to NPH or the shunt.      Review of Systems   HENT:  Negative for tinnitus.    Eyes:  Negative for pain and visual disturbance.   Genitourinary:  Positive for enuresis.   Musculoskeletal:  Positive for gait problem (primarily in wheelchair).   Neurological:  Positive for speech difficulty (word finding) and weakness. Negative for dizziness, light-headedness, numbness and headaches.   Psychiatric/Behavioral:  Positive for confusion. Negative for sleep disturbance.    All other systems reviewed and are negative.    I have personally reviewed the MA's review of systems and made changes as necessary.    Current Outpatient Medications on File Prior to Visit   Medication Sig Dispense Refill    acetaminophen (TYLENOL) 325 mg  tablet Take 2 tablets (650 mg total) by mouth every 6 (six) hours as needed for mild pain  0    acetaminophen (TYLENOL) 500 mg tablet Take 500 mg by mouth every 6 (six) hours as needed for mild pain      ammonium lactate (LAC-HYDRIN) 12 % lotion       atorvastatin (LIPITOR) 40 mg tablet TAKE 1 TABLET ORALLY DAILY (CHOLESTEROL) *NOT ON AUTO/PUT ON AUTO WHEN REORDERED* 30 tablet 11    bisacodyl 5 MG EC tablet TAKE 1 TABLET ORALLY DAILY AS NEEDED FOR CONSTIPATION *REORDER* 30 tablet 11    D3-1000 25 MCG (1000 UT) tablet TAKE 1 TABLET ORALLY TWICE DAILY (SUPPLEMENT) 60 tablet 11    dabigatran etexilate (PRADAXA) 150 mg capsu TAKE 1 CAPSULE ORALLY TWICE DAILY (ATRIAL FIBRILLATION) 60 capsule 5    Desitin Daily Defense 13 % cream APPLY TOPICALLY TO SKIN EXCORIATION ON BOTTOM TWICE DAILY (EXCORIATION) *REORDER*;APPLY TOPICALLY TO SKIN EXCORIATION ON BOTTOM AS NEEDED FOR SOILAGE (EXCORIATION) *REORDER* 113 g 5    escitalopram (LEXAPRO) 10 mg tablet TAKE 1 TABLET ORALLY DAILY (DEPRESSION) 30 tablet 11    ferrous gluconate (FERGON) 240 (27 FE) MG tablet Take 240 mg by mouth      hydrocortisone (ANUSOL-HC) 2.5 % rectal cream Apply topically 2 (two) times a day 28 g 11    losartan (COZAAR) 50 mg tablet TAKE 1 TABLET ORALLY TWICE DAILY (LIPIDS) 60 tablet 11    melatonin 3 mg TAKE 1 TABLET ORALLY AT BEDTIME AS NEEDED FOR SLEEP AID 30 tablet 5    metoprolol succinate (TOPROL-XL) 50 mg 24 hr tablet Take 1 tablet (50 mg total) by mouth daily 90 tablet 3    Multiple Vitamin (One-Daily Multi-Vitamin) TABS TAKE 1 TABLET ORALLY DAILY (SUPPLEMENT) 30 tablet 11    OLANZapine (ZyPREXA) 2.5 mg tablet TAKE 1 TABLET ORALLY NIGHTLY (DEPRESSION) 30 tablet 11    omeprazole (PriLOSEC) 20 mg delayed release capsule TAKE 1 CAPSULE ORALLY DAILY (CAP MAY BE OPENED & SPRINKLED ON APPLESAUCE) (GASTROESOPHAGEAL REFLUX DISEASE) 30 capsule 5    spironolactone (ALDACTONE) 25 mg tablet TAKE 1 TABLET ORALLY DAILY (HYPERTENSION) 30 tablet 11    traZODone  (DESYREL) 50 mg tablet       Ferate 240 (27 Fe) MG tablet TAKE 1 TABLET ORALLY DAILY WITH BREAKFAST (SUPPLEMENT) (Patient not taking: Reported on 2023) 30 tablet 11    magnesium Oxide (MAG-OX) 400 mg TABS TAKE 1 TABLET ORALLY TWICE DAILY (SUPPLEMENT) (Patient not taking: Reported on 2023) 60 tablet 5    Milk of Magnesia 400 MG/5ML oral suspension TAKE 30ML ORALLY AS NEEDED FOR CONSTIPATION IF NO BOWEL MOVEMENT IN 3 DAYS. GIVE AT BEDTIME IF NO BOWEL MOVEMENT IN 3 DAYS. SEPARATE BY OTHER MEDICATION BY AT LEAST 2 HOURS (Patient not taking: Reported on 2024) 473 mL 5    Mouthwashes (Biotene Dry Mouth) LIQD RINSE WITH 1 OUNCE ORALLY EVERY MORNING AND AT BEDTIME FOR DRY MOUTH, RINSE FOR 30 SECONDS THEN SPIT, DO NOT SWALLOW *REORDER* (Patient not taking: Reported on 2023) 237 mL 11    sodium chloride 1 g tablet TAKE TWO TABLETS BY MOUTH TWICE A DAY (Patient not taking: Reported on 2023) 360 tablet 3    Sodium Phosphates (Fleet Enema) ENEM INSERT 1 ENEMA RECTALLY AS NEEDED FOR CONSTIPATION IF NO RESULT FROM BISACODYL WITHIN 2 HOURS. IF NO RESULTS FROM ENEMA. CALL PROVIDER FOR FURTHER ORDERS *REORDER* (Patient not taking: Reported on 10/9/2024) 133 mL 5    torsemide (DEMADEX) 10 mg tablet TAKE 1 TABLET ORALLY DAILY (EDEMA) (Patient not taking: Reported on 2023) 30 tablet 5    Vascepa 1 g CAPS TAKE 2 CAPSULES (2GM) ORALLY TWICE DAILY (CARDIAC HEALTH) *BRAND PER INSURANCE* (Patient not taking: Reported on 10/9/2024) 120 capsule 11     No current facility-administered medications on file prior to visit.      Social History     Tobacco Use    Smoking status: Former     Current packs/day: 0.00     Types: Cigarettes     Quit date:      Years since quittin.8    Smokeless tobacco: Never    Tobacco comments:     no secondhand smoke exposure   Vaping Use    Vaping status: Never Used   Substance and Sexual Activity    Alcohol use: Yes     Comment: social    Drug use: No    Sexual activity:  "Not on file     Objective     /74 (BP Location: Right arm, Patient Position: Sitting, Cuff Size: Standard)   Pulse 79   Temp 97.9 °F (36.6 °C) (Temporal)   Resp 17   Ht 5' 0.67\" (1.541 m)   Wt 67.6 kg (149 lb)   SpO2 98%   BMI 28.46 kg/m²     Physical Exam  Vitals reviewed.   Constitutional:       General: She is not in acute distress.  Eyes:      Extraocular Movements: Extraocular movements intact.   Pulmonary:      Effort: Pulmonary effort is normal. No respiratory distress.   Abdominal:      General: Abdomen is flat. There is no distension.   Skin:     General: Skin is warm and dry.      Comments: Right frontal  shunt depresses and refills easily.   Neurological:      Mental Status: She is alert.      Comments: In a wheelchair.  Participates in conversation, indicates that she is not particularly interested in any type of adjustment to the shunt or further investigation.     Psychiatric:         Mood and Affect: Mood normal.         Behavior: Behavior normal.     Neurologic Exam    No pertinent imaging studies reviewed.    Administrative Statements   I have spent a total time of 20 minutes in caring for this patient on the day of the visit/encounter including Prognosis, Patient and family education, Impressions, Counseling / Coordination of care, Documenting in the medical record, and Reviewing / ordering tests, medicine, procedures  .  " altered mental status. bilateral upper extremities and bilateral lower extremities grossly 3-/5/grossly assessed due to